# Patient Record
Sex: MALE | Race: WHITE | NOT HISPANIC OR LATINO | Employment: FULL TIME | URBAN - METROPOLITAN AREA
[De-identification: names, ages, dates, MRNs, and addresses within clinical notes are randomized per-mention and may not be internally consistent; named-entity substitution may affect disease eponyms.]

---

## 2017-03-02 ENCOUNTER — OFFICE VISIT (OUTPATIENT)
Dept: RETAIL CLINIC | Facility: CLINIC | Age: 42
End: 2017-03-02

## 2017-03-02 VITALS
DIASTOLIC BLOOD PRESSURE: 60 MMHG | HEART RATE: 90 BPM | RESPIRATION RATE: 16 BRPM | TEMPERATURE: 99.2 F | SYSTOLIC BLOOD PRESSURE: 132 MMHG | OXYGEN SATURATION: 98 %

## 2017-03-02 DIAGNOSIS — J11.1 INFLUENZA: Primary | ICD-10-CM

## 2017-03-02 LAB
EXPIRATION DATE: NORMAL
FLUAV AG NPH QL: NORMAL
FLUBV AG NPH QL: NORMAL
INTERNAL CONTROL: NORMAL
Lab: NORMAL

## 2017-03-02 PROCEDURE — 99213 OFFICE O/P EST LOW 20 MIN: CPT | Performed by: NURSE PRACTITIONER

## 2017-03-02 PROCEDURE — 87804 INFLUENZA ASSAY W/OPTIC: CPT | Performed by: NURSE PRACTITIONER

## 2017-03-02 RX ORDER — BROMPHENIRAMINE MALEATE, PSEUDOEPHEDRINE HYDROCHLORIDE, AND DEXTROMETHORPHAN HYDROBROMIDE 2; 30; 10 MG/5ML; MG/5ML; MG/5ML
SYRUP ORAL
Qty: 240 ML | Refills: 0 | Status: SHIPPED | OUTPATIENT
Start: 2017-03-02 | End: 2017-07-06

## 2017-03-02 RX ORDER — OSELTAMIVIR PHOSPHATE 75 MG/1
75 CAPSULE ORAL 2 TIMES DAILY
Qty: 10 CAPSULE | Refills: 0 | Status: SHIPPED | OUTPATIENT
Start: 2017-03-02 | End: 2017-03-07

## 2017-03-03 NOTE — PATIENT INSTRUCTIONS
"Influenza, Adult  Influenza (\"the flu\") is a viral infection of the respiratory tract. It occurs more often in winter months because people spend more time in close contact with one another. Influenza can make you feel very sick. Influenza easily spreads from person to person (contagious).  CAUSES   Influenza is caused by a virus that infects the respiratory tract. You can catch the virus by breathing in droplets from an infected person's cough or sneeze. You can also catch the virus by touching something that was recently contaminated with the virus and then touching your mouth, nose, or eyes.  RISKS AND COMPLICATIONS  You may be at risk for a more severe case of influenza if you smoke cigarettes, have diabetes, have chronic heart disease (such as heart failure) or lung disease (such as asthma), or if you have a weakened immune system. Elderly people and pregnant women are also at risk for more serious infections. The most common problem of influenza is a lung infection (pneumonia). Sometimes, this problem can require emergency medical care and may be life threatening.  SIGNS AND SYMPTOMS   Symptoms typically last 4 to 10 days and may include:  · Fever.  · Chills.  · Headache, body aches, and muscle aches.  · Sore throat.  · Chest discomfort and cough.  · Poor appetite.  · Weakness or feeling tired.  · Dizziness.  · Nausea or vomiting.  DIAGNOSIS   Diagnosis of influenza is often made based on your history and a physical exam. A nose or throat swab test can be done to confirm the diagnosis.  TREATMENT   In mild cases, influenza goes away on its own. Treatment is directed at relieving symptoms. For more severe cases, your health care provider may prescribe antiviral medicines to shorten the sickness. Antibiotic medicines are not effective because the infection is caused by a virus, not by bacteria.  HOME CARE INSTRUCTIONS  · Take medicines only as directed by your health care provider.  · Use a cool mist humidifier " to make breathing easier.  · Get plenty of rest until your temperature returns to normal. This usually takes 3 to 4 days.  · Drink enough fluid to keep your urine clear or pale yellow.  · Cover your mouth and nose when coughing or sneezing, and wash your hands well to prevent the virus from spreading.  · Stay home from work or school until the fever is gone for at least 1 full day.  PREVENTION   An annual influenza vaccination (flu shot) is the best way to avoid getting influenza. An annual flu shot is now routinely recommended for all adults in the [place]  SEEK MEDICAL CARE IF:  · You experience chest pain, your cough worsens, or you produce more mucus.  · You have nausea, vomiting, or diarrhea.  · Your fever returns or gets worse.  SEEK IMMEDIATE MEDICAL CARE IF:  · You have trouble breathing, you become short of breath, or your skin or nails become bluish.  · You have severe pain or stiffness in the neck.  · You develop a sudden headache, or pain in the face or ear.  · You have nausea or vomiting that you cannot control.  MAKE SURE YOU:   · Understand these instructions.  · Will watch your condition.  · Will get help right away if you are not doing well or get worse.     This information is not intended to replace advice given to you by your health care provider. Make sure you discuss any questions you have with your health care provider.     Document Released: 12/15/2001 Document Revised: 01/08/2016 Document Reviewed: 10/11/2016  [employer] Interactive Patient Education ©2016 [employer]

## 2017-03-03 NOTE — PROGRESS NOTES
Subjective:     Otto Pantoja is a 41 y.o.     Flu Symptoms   This is a new problem. The current episode started in the past 7 days. Associated symptoms include congestion, coughing, fatigue, a fever, myalgias, nausea (mild this am) and a sore throat (from drainage). Pertinent negatives include no rash or vomiting. Headaches: mildf. Treatments tried: antihistmaine and mucinex. The treatment provided no relief.         The following portions of the patient's history were reviewed and updated as appropriate: allergies, current medications, past family history, past medical history, past social history, past surgical history and problem list.      Review of Systems   Constitutional: Positive for fatigue and fever.   HENT: Positive for congestion, postnasal drip, sinus pressure and sore throat (from drainage).    Respiratory: Positive for cough and shortness of breath. Negative for wheezing.    Cardiovascular: Negative.    Gastrointestinal: Positive for nausea (mild this am). Negative for diarrhea and vomiting.   Musculoskeletal: Positive for myalgias.   Skin: Negative for color change, pallor and rash.   Neurological: Negative for dizziness and light-headedness. Headaches: mildf.         Objective:      Physical Exam   Constitutional: He is oriented to person, place, and time. He appears well-developed and well-nourished. He is cooperative.   couhging intermittently at visit   HENT:   Head: Normocephalic and atraumatic.   Right Ear: Tympanic membrane and ear canal normal.   Left Ear: Tympanic membrane and ear canal normal.   Nose: Rhinorrhea present.   Mouth/Throat: No oropharyngeal exudate or posterior oropharyngeal erythema.   Nares mildly congested. Post nasal drainage clear   Eyes: Conjunctivae, EOM and lids are normal. Pupils are equal, round, and reactive to light. Right eye conjunctiva injected: mildly. Left eye conjunctiva injected: mildly.   Neck: Normal range of motion. Neck supple.   Cardiovascular: Normal  rate, regular rhythm, S1 normal, S2 normal and normal heart sounds.    Pulmonary/Chest: Effort normal and breath sounds normal.   Abdominal: Soft. Normal appearance and bowel sounds are normal. There is no tenderness.   Musculoskeletal: Normal range of motion.   Neurological: He is alert and oriented to person, place, and time.   Skin: Skin is warm, dry and intact.   Psychiatric: He has a normal mood and affect. His speech is normal and behavior is normal.   Vitals reviewed.          Otto was seen today for flu symptoms.    Diagnoses and all orders for this visit:    Influenza  -     POC Influenza A / B    Other orders  -     brompheniramine-pseudoephedrine-DM (BROMFED DM) 30-2-10 MG/5ML syrup; 5 to 10 cc every 4 hours as needed for cough, congestion, allergies  -     oseltamivir (TAMIFLU) 75 MG capsule; Take 1 capsule by mouth 2 (Two) Times a Day for 5 days.

## 2017-07-06 ENCOUNTER — OFFICE VISIT (OUTPATIENT)
Dept: FAMILY MEDICINE CLINIC | Facility: CLINIC | Age: 42
End: 2017-07-06

## 2017-07-06 VITALS
TEMPERATURE: 98.3 F | WEIGHT: 167 LBS | OXYGEN SATURATION: 99 % | HEIGHT: 70 IN | SYSTOLIC BLOOD PRESSURE: 122 MMHG | HEART RATE: 86 BPM | DIASTOLIC BLOOD PRESSURE: 84 MMHG | BODY MASS INDEX: 23.91 KG/M2

## 2017-07-06 DIAGNOSIS — Z86.010 HISTORY OF COLON POLYPS: Primary | ICD-10-CM

## 2017-07-06 DIAGNOSIS — F17.210 CIGARETTE SMOKER: ICD-10-CM

## 2017-07-06 PROCEDURE — 99214 OFFICE O/P EST MOD 30 MIN: CPT | Performed by: NURSE PRACTITIONER

## 2017-07-06 NOTE — PROGRESS NOTES
"Subjective   Otto Pantoja is a 42 y.o. male.     History of Present Illness     New pt.  Former PCP Dr. Medina who is retiring soon.    Pt generally healthy.  Last fasting labs and CPE about 7 years ago.     Mother hx colon CA age 42--alive and well now.  Pt has had two c-scopes--2 and 4 yrs ago.  Initial scope revealed multiple benign polyps.  Second scope no polyps.  Due next year for repeat--Dr. Key.      Smokes 1/2 ppd since age 19.  Wants to quit.  Has tried Chantix--doesn't want to try again due to nightmares.      Went to [facility] a few days ago.  10/15 people developed N/V/D.  [place].  He is much better today.  But, still feels \"wiped out.\"  Diarrhea almost completely resolved now.  He states that he only mentioned this because his wife thought he should.  But, he is almost over the infection.  He is reporting the incident to the [employer].  I wonder if someone emptied their houseboat waste system into the lake.      S/p pilonidal cyst excision per Dr. Allen 2001.      He has had two actinic keratoses removed from his face--Dr. Guzmán.    Works as a .   w/two children.      The following portions of the patient's history were reviewed and updated as appropriate: allergies, current medications, past family history, past medical history, past social history, past surgical history and problem list.    Review of Systems   Gastrointestinal:        As discussed in the HPI   All other systems reviewed and are negative.      Objective   Physical Exam   Constitutional: Vital signs are normal. He appears well-developed and well-nourished.   Cardiovascular: Normal rate, regular rhythm, S1 normal, S2 normal and normal heart sounds.    No murmur heard.  Pulmonary/Chest: Effort normal and breath sounds normal.   Neurological: He is alert.   Psychiatric: He has a normal mood and affect.   Nursing note and vitals reviewed.      Assessment/Plan   Otto was seen today for " establish care.    Diagnoses and all orders for this visit:    History of colon polyps    Cigarette smoker        We spent <3 min discussing smoking cessation.  I recommended Raphael-Sloan method and nicotine patches.      RTC in the next month for CPE w/fasting labs.  Needs tetanus vaccination.  Will request records from former PCP.

## 2017-08-09 ENCOUNTER — TELEPHONE (OUTPATIENT)
Dept: FAMILY MEDICINE CLINIC | Facility: CLINIC | Age: 42
End: 2017-08-09

## 2017-08-09 ENCOUNTER — OFFICE VISIT (OUTPATIENT)
Dept: FAMILY MEDICINE CLINIC | Facility: CLINIC | Age: 42
End: 2017-08-09

## 2017-08-09 VITALS
DIASTOLIC BLOOD PRESSURE: 84 MMHG | HEIGHT: 70 IN | HEART RATE: 73 BPM | OXYGEN SATURATION: 100 % | SYSTOLIC BLOOD PRESSURE: 124 MMHG | TEMPERATURE: 98.3 F | BODY MASS INDEX: 24.48 KG/M2 | WEIGHT: 171 LBS

## 2017-08-09 DIAGNOSIS — F17.210 CIGARETTE SMOKER: ICD-10-CM

## 2017-08-09 DIAGNOSIS — Z00.00 ANNUAL PHYSICAL EXAM: Primary | ICD-10-CM

## 2017-08-09 LAB
DEVELOPER EXPIRATION DATE: NORMAL
DEVELOPER LOT NUMBER: NORMAL
EXPIRATION DATE: NORMAL
FECAL OCCULT BLOOD SCREEN, POC: NEGATIVE
Lab: NORMAL
NEGATIVE CONTROL: NEGATIVE
POSITIVE CONTROL: POSITIVE

## 2017-08-09 PROCEDURE — 90715 TDAP VACCINE 7 YRS/> IM: CPT | Performed by: NURSE PRACTITIONER

## 2017-08-09 PROCEDURE — 99396 PREV VISIT EST AGE 40-64: CPT | Performed by: NURSE PRACTITIONER

## 2017-08-09 PROCEDURE — 93000 ELECTROCARDIOGRAM COMPLETE: CPT | Performed by: NURSE PRACTITIONER

## 2017-08-09 PROCEDURE — 82274 ASSAY TEST FOR BLOOD FECAL: CPT | Performed by: NURSE PRACTITIONER

## 2017-08-09 PROCEDURE — 90471 IMMUNIZATION ADMIN: CPT | Performed by: NURSE PRACTITIONER

## 2017-08-09 NOTE — PROGRESS NOTES
Procedure     ECG 12 Lead  Date/Time: 8/9/2017 9:47 AM  Performed by: MONA SCHMIDT  Authorized by: MONA SCHMIDT   Interpreted by ED physician: Interpreted by myself.  Comparison: not compared with previous ECG   Previous ECG: no previous ECG available  Rate: normal  Conduction: conduction normal  ST Segments: ST segments normal  T Waves: T waves normal  QRS axis: normal  Other: no other findings  Clinical impression: normal ECG

## 2017-08-09 NOTE — PROGRESS NOTES
Subjective   Otto Pantoja is a 42 y.o. male.     History of Present Illness       Here for CPE with fasting labs.  No c/o's.    Needs tetanus vaccine.  Due for screening c-scope next year per Dr. Key.   colon CA.    He has decreased his smoking to a few cigarettes a day.    Exercises regularly.  .      Eye and dental exams UTD.       The following portions of the patient's history were reviewed and updated as appropriate: allergies, current medications, past family history, past medical history, past social history, past surgical history and problem list.    Review of Systems   Respiratory: Negative.    Cardiovascular: Negative.    Gastrointestinal: Negative.    All other systems reviewed and are negative.      Objective   Physical Exam   Constitutional: He is oriented to person, place, and time. Vital signs are normal. He appears well-developed and well-nourished. He is cooperative.   HENT:   Head: Normocephalic and atraumatic.   Right Ear: Hearing, tympanic membrane, external ear and ear canal normal.   Left Ear: Hearing, tympanic membrane, external ear and ear canal normal.   Nose: Nose normal.   Mouth/Throat: Uvula is midline, oropharynx is clear and moist and mucous membranes are normal. No tonsillar exudate.   Eyes: Conjunctivae, EOM and lids are normal. Pupils are equal, round, and reactive to light.   Neck: Trachea normal, normal range of motion, full passive range of motion without pain and phonation normal. Neck supple. Carotid bruit is not present. No thyromegaly present.   Cardiovascular: Normal rate, regular rhythm, S1 normal, S2 normal and normal heart sounds.    No murmur heard.  Pulses:       Carotid pulses are 2+ on the right side, and 2+ on the left side.       Radial pulses are 2+ on the right side, and 2+ on the left side.        Femoral pulses are 2+ on the right side, and 2+ on the left side.       Dorsalis pedis pulses are 2+ on the right side, and 2+ on the left side.         Posterior tibial pulses are 2+ on the right side, and 2+ on the left side.   No carotid bruits   Pulmonary/Chest: Effort normal and breath sounds normal. He has no decreased breath sounds. He has no wheezes. He has no rhonchi. He has no rales.   Abdominal: Soft. Normal appearance, normal aorta and bowel sounds are normal. He exhibits no distension. There is no hepatosplenomegaly, splenomegaly or hepatomegaly. There is no tenderness. No hernia. Hernia confirmed negative in the ventral area, confirmed negative in the right inguinal area and confirmed negative in the left inguinal area.   Genitourinary: Rectum normal, testes normal and penis normal. Rectal exam shows guaiac negative stool. Circumcised.   Musculoskeletal: Normal range of motion.        Right shoulder: Normal.        Left shoulder: Normal.        Right elbow: Normal.       Left elbow: Normal.        Right wrist: Normal.        Left wrist: Normal.        Right hip: Normal.        Left hip: Normal.        Right knee: Normal.        Left knee: Normal.        Right ankle: Normal.        Left ankle: Normal.        Cervical back: Normal.        Thoracic back: Normal.        Lumbar back: Normal.        Right upper arm: Normal.        Left upper arm: Normal.        Right forearm: Normal.        Left forearm: Normal.        Right hand: Normal.        Left hand: Normal.        Right upper leg: Normal.        Left upper leg: Normal.        Right lower leg: Normal.        Left lower leg: Normal.        Right foot: Normal.        Left foot: Normal.   Lymphadenopathy:        Head (right side): No submental, no submandibular and no tonsillar adenopathy present.        Head (left side): No submental, no submandibular and no tonsillar adenopathy present.     He has no cervical adenopathy.     He has no axillary adenopathy. No inguinal adenopathy noted on the right or left side.        Right: No inguinal and no supraclavicular adenopathy present.        Left: No inguinal  and no supraclavicular adenopathy present.   Neurological: He is alert and oriented to person, place, and time. He has normal strength and normal reflexes. No cranial nerve deficit or sensory deficit. He displays a negative Romberg sign. GCS eye subscore is 4. GCS verbal subscore is 5. GCS motor subscore is 6.   Skin: Skin is warm, dry and intact. No rash noted. No cyanosis. Nails show no clubbing.   Psychiatric: He has a normal mood and affect. His speech is normal and behavior is normal. Judgment and thought content normal. Cognition and memory are normal.   Nursing note and vitals reviewed.      Assessment/Plan   Otto was seen today for annual exam.    Diagnoses and all orders for this visit:    Annual physical exam  -     CBC & Differential  -     Lipid Panel With / Chol / HDL Ratio  -     Comprehensive Metabolic Panel  -     Urinalysis With / Culture If Indicated  -     TSH Rfx On Abnormal To Free T4  -     Vitamin D 25 Hydroxy  -     POC Occult Blood Stool    Cigarette smoker    Other orders  -     Tdap Vaccine Greater Than or Equal To 6yo IM        <3 min spent discussing smoking cessation.      Await lab results.    rec flu vaccine this year.

## 2017-08-10 LAB
25(OH)D3+25(OH)D2 SERPL-MCNC: 36.9 NG/ML (ref 30–100)
ALBUMIN SERPL-MCNC: 4.5 G/DL (ref 3.5–5.2)
ALBUMIN/GLOB SERPL: 1.7 G/DL
ALP SERPL-CCNC: 72 U/L (ref 39–117)
ALT SERPL-CCNC: 25 U/L (ref 1–41)
APPEARANCE UR: CLEAR
AST SERPL-CCNC: 18 U/L (ref 1–40)
BACTERIA #/AREA URNS HPF: NORMAL /HPF
BASOPHILS # BLD AUTO: 0.02 10*3/MM3 (ref 0–0.2)
BASOPHILS NFR BLD AUTO: 0.3 % (ref 0–1.5)
BILIRUB SERPL-MCNC: 1 MG/DL (ref 0.1–1.2)
BILIRUB UR QL STRIP: NEGATIVE
BUN SERPL-MCNC: 11 MG/DL (ref 6–20)
BUN/CREAT SERPL: 12.1 (ref 7–25)
CALCIUM SERPL-MCNC: 10.3 MG/DL (ref 8.6–10.5)
CHLORIDE SERPL-SCNC: 102 MMOL/L (ref 98–107)
CHOLEST SERPL-MCNC: 214 MG/DL (ref 0–200)
CHOLEST/HDLC SERPL: 2.97 {RATIO}
CO2 SERPL-SCNC: 28.5 MMOL/L (ref 22–29)
COLOR UR: YELLOW
CREAT SERPL-MCNC: 0.91 MG/DL (ref 0.76–1.27)
EOSINOPHIL # BLD AUTO: 0.18 10*3/MM3 (ref 0–0.7)
EOSINOPHIL NFR BLD AUTO: 2.5 % (ref 0.3–6.2)
EPI CELLS #/AREA URNS HPF: NORMAL /HPF
ERYTHROCYTE [DISTWIDTH] IN BLOOD BY AUTOMATED COUNT: 13.4 % (ref 11.5–14.5)
GLOBULIN SER CALC-MCNC: 2.6 GM/DL
GLUCOSE SERPL-MCNC: 99 MG/DL (ref 65–99)
GLUCOSE UR QL: NEGATIVE
HCT VFR BLD AUTO: 50.7 % (ref 40.4–52.2)
HDLC SERPL-MCNC: 72 MG/DL (ref 40–60)
HGB BLD-MCNC: 17 G/DL (ref 13.7–17.6)
HGB UR QL STRIP: NEGATIVE
IMM GRANULOCYTES # BLD: 0 10*3/MM3 (ref 0–0.03)
IMM GRANULOCYTES NFR BLD: 0 % (ref 0–0.5)
KETONES UR QL STRIP: NEGATIVE
LDLC SERPL CALC-MCNC: 122 MG/DL (ref 0–100)
LEUKOCYTE ESTERASE UR QL STRIP: NEGATIVE
LYMPHOCYTES # BLD AUTO: 2.78 10*3/MM3 (ref 0.9–4.8)
LYMPHOCYTES NFR BLD AUTO: 38 % (ref 19.6–45.3)
MCH RBC QN AUTO: 31.9 PG (ref 27–32.7)
MCHC RBC AUTO-ENTMCNC: 33.5 G/DL (ref 32.6–36.4)
MCV RBC AUTO: 95.1 FL (ref 79.8–96.2)
MICRO URNS: NORMAL
MICRO URNS: NORMAL
MONOCYTES # BLD AUTO: 0.46 10*3/MM3 (ref 0.2–1.2)
MONOCYTES NFR BLD AUTO: 6.3 % (ref 5–12)
MUCOUS THREADS URNS QL MICRO: PRESENT /HPF
NEUTROPHILS # BLD AUTO: 3.88 10*3/MM3 (ref 1.9–8.1)
NEUTROPHILS NFR BLD AUTO: 52.9 % (ref 42.7–76)
NITRITE UR QL STRIP: NEGATIVE
PH UR STRIP: 7 [PH] (ref 5–7.5)
PLATELET # BLD AUTO: 254 10*3/MM3 (ref 140–500)
POTASSIUM SERPL-SCNC: 5.4 MMOL/L (ref 3.5–5.2)
PROT SERPL-MCNC: 7.1 G/DL (ref 6–8.5)
PROT UR QL STRIP: NEGATIVE
RBC # BLD AUTO: 5.33 10*6/MM3 (ref 4.6–6)
RBC #/AREA URNS HPF: NORMAL /HPF
SODIUM SERPL-SCNC: 142 MMOL/L (ref 136–145)
SP GR UR: 1.02 (ref 1–1.03)
TRIGL SERPL-MCNC: 101 MG/DL (ref 0–150)
TSH SERPL DL<=0.005 MIU/L-ACNC: 2.08 MIU/ML (ref 0.27–4.2)
URINALYSIS REFLEX: NORMAL
UROBILINOGEN UR STRIP-MCNC: 0.2 MG/DL (ref 0.2–1)
VLDLC SERPL CALC-MCNC: 20.2 MG/DL (ref 5–40)
WBC # BLD AUTO: 7.32 10*3/MM3 (ref 4.5–10.7)
WBC #/AREA URNS HPF: NORMAL /HPF

## 2017-11-16 ENCOUNTER — OFFICE VISIT (OUTPATIENT)
Dept: RETAIL CLINIC | Facility: CLINIC | Age: 42
End: 2017-11-16

## 2017-11-16 VITALS
OXYGEN SATURATION: 98 % | TEMPERATURE: 98.7 F | SYSTOLIC BLOOD PRESSURE: 141 MMHG | RESPIRATION RATE: 18 BRPM | DIASTOLIC BLOOD PRESSURE: 82 MMHG | HEART RATE: 99 BPM

## 2017-11-16 DIAGNOSIS — J01.00 ACUTE NON-RECURRENT MAXILLARY SINUSITIS: Primary | ICD-10-CM

## 2017-11-16 PROCEDURE — 99213 OFFICE O/P EST LOW 20 MIN: CPT | Performed by: NURSE PRACTITIONER

## 2017-11-16 RX ORDER — PREDNISONE 20 MG/1
20 TABLET ORAL 2 TIMES DAILY
Qty: 14 TABLET | Refills: 0 | Status: SHIPPED | OUTPATIENT
Start: 2017-11-16 | End: 2017-11-23

## 2017-11-16 RX ORDER — AZELASTINE 1 MG/ML
2 SPRAY, METERED NASAL 2 TIMES DAILY
Qty: 30 ML | Refills: 0 | Status: SHIPPED | OUTPATIENT
Start: 2017-11-16 | End: 2017-11-30

## 2017-11-17 NOTE — PROGRESS NOTES
"Lakshmi Pantoja is a 42 y.o. male.     Sinusitis   This is a new problem. Episode onset: 3 days ago. The problem is unchanged. There has been no fever. The pain is mild. Associated symptoms include congestion, coughing (nonproductive), headaches, sinus pressure and a sore throat (\"scratchy throat\"). Pertinent negatives include no chills, diaphoresis, ear pain, hoarse voice, neck pain, shortness of breath, sneezing or swollen glands. Past treatments include nothing.       The following portions of the patient's history were reviewed and updated as appropriate: allergies, current medications, past family history, past medical history, past social history, past surgical history and problem list.    Review of Systems   Constitutional: Positive for fatigue. Negative for appetite change, chills, diaphoresis and fever.   HENT: Positive for congestion, postnasal drip, sinus pressure and sore throat (\"scratchy throat\"). Negative for ear discharge, ear pain, facial swelling, hearing loss, hoarse voice, mouth sores, nosebleeds, rhinorrhea, sinus pain, sneezing, trouble swallowing and voice change.    Eyes: Negative for pain, discharge, redness, itching and visual disturbance.   Respiratory: Positive for cough (nonproductive). Negative for chest tightness, shortness of breath, wheezing and stridor.    Cardiovascular: Negative for chest pain and palpitations.   Gastrointestinal: Negative for abdominal pain, constipation, diarrhea, nausea and vomiting.   Genitourinary: Negative for decreased urine volume.   Musculoskeletal: Negative for myalgias, neck pain and neck stiffness.   Skin: Negative for rash.   Allergic/Immunologic: Negative for environmental allergies and immunocompromised state.   Neurological: Positive for headaches. Negative for dizziness, syncope and weakness.       Objective   Physical Exam   Constitutional: He is oriented to person, place, and time. He appears well-developed and well-nourished. He is " cooperative.  Non-toxic appearance. He does not appear ill. No distress.   HENT:   Right Ear: Hearing, external ear and ear canal normal. Tympanic membrane is not perforated, not erythematous, not retracted and not bulging. A middle ear effusion is present.   Left Ear: Hearing, external ear and ear canal normal. Tympanic membrane is not perforated, not erythematous, not retracted and not bulging. A middle ear effusion is present.   Nose: Mucosal edema present. No rhinorrhea or sinus tenderness. Right sinus exhibits maxillary sinus tenderness. Right sinus exhibits no frontal sinus tenderness. Left sinus exhibits maxillary sinus tenderness. Left sinus exhibits no frontal sinus tenderness.   Mouth/Throat: Uvula is midline and mucous membranes are normal. Posterior oropharyngeal erythema (mild, cobblestoning) present. No oropharyngeal exudate or posterior oropharyngeal edema. Tonsils are 2+ on the right. Tonsils are 2+ on the left. No tonsillar exudate.   Eyes: Conjunctivae and lids are normal.   Cardiovascular: Normal rate, regular rhythm, S1 normal and S2 normal.    Pulmonary/Chest: Effort normal and breath sounds normal.   Abdominal: Bowel sounds are normal. There is no tenderness.   Lymphadenopathy:     He has no cervical adenopathy.   Neurological: He is alert and oriented to person, place, and time.   Skin: Skin is warm and dry. He is not diaphoretic.   Vitals reviewed.      Assessment/Plan   Otto was seen today for sinusitis.    Diagnoses and all orders for this visit:    Acute non-recurrent maxillary sinusitis  -     predniSONE (DELTASONE) 20 MG tablet; Take 1 tablet by mouth 2 (Two) Times a Day for 7 days.  -     azelastine (ASTELIN) 0.1 % nasal spray; 2 sprays into each nostril 2 (Two) Times a Day for 14 days. Use in each nostril as directed          -     Increase H2O intake         -     Follow up with PCP or urgent treatment for persistent or worsening symptoms

## 2018-10-18 ENCOUNTER — OFFICE VISIT (OUTPATIENT)
Dept: RETAIL CLINIC | Facility: CLINIC | Age: 43
End: 2018-10-18

## 2018-10-18 VITALS
OXYGEN SATURATION: 98 % | DIASTOLIC BLOOD PRESSURE: 78 MMHG | TEMPERATURE: 98.4 F | RESPIRATION RATE: 18 BRPM | SYSTOLIC BLOOD PRESSURE: 106 MMHG | HEART RATE: 96 BPM

## 2018-10-18 DIAGNOSIS — J32.9 SINUSITIS, UNSPECIFIED CHRONICITY, UNSPECIFIED LOCATION: Primary | ICD-10-CM

## 2018-10-18 PROCEDURE — 99213 OFFICE O/P EST LOW 20 MIN: CPT | Performed by: NURSE PRACTITIONER

## 2018-10-18 RX ORDER — PREDNISONE 20 MG/1
20 TABLET ORAL 2 TIMES DAILY
Qty: 10 TABLET | Refills: 0 | Status: SHIPPED | OUTPATIENT
Start: 2018-10-18 | End: 2019-01-19

## 2018-10-18 RX ORDER — DOXYCYCLINE 100 MG/1
100 CAPSULE ORAL 2 TIMES DAILY
Qty: 10 CAPSULE | Refills: 0 | Status: SHIPPED | OUTPATIENT
Start: 2018-10-18 | End: 2019-01-19

## 2018-10-18 NOTE — PATIENT INSTRUCTIONS

## 2018-10-18 NOTE — PROGRESS NOTES
Subjective:     Otto Pantoja is a 43 y.o.     Sinusitis   The current episode started in the past 7 days. There has been no fever. Associated symptoms include congestion (green/yellow), coughing, headaches, sinus pressure and a sore throat. Pertinent negatives include no shortness of breath. Ear pain: feel clogged. Treatments tried: mucinex D, antihistamine. The treatment provided no relief.         The following portions of the patient's history were reviewed and updated as appropriate: allergies, current medications, past family history, past medical history, past social history, past surgical history and problem list.      Review of Systems   HENT: Positive for congestion (green/yellow), sinus pain, sinus pressure and sore throat. Ear pain: feel clogged.    Respiratory: Positive for cough. Negative for shortness of breath and wheezing.    Cardiovascular: Negative.    Neurological: Positive for headaches.         Objective:      Physical Exam   HENT:   Head: Normocephalic and atraumatic.   Right Ear: Ear canal normal. Right ear middle ear effusion: mild serous.   Left Ear: Ear canal normal. Left ear middle ear effusion: mild serous.   Mouth/Throat: Posterior oropharyngeal erythema present. No oropharyngeal exudate.   Mild purulent nasal congestion   Lymphadenopathy:     He has no cervical adenopathy.   Vitals reviewed.          Otto was seen today for sinusitis.    Diagnoses and all orders for this visit:    Sinusitis, unspecified chronicity, unspecified location    Other orders  -     predniSONE (DELTASONE) 20 MG tablet; Take 1 tablet by mouth 2 (Two) Times a Day.  -     doxycycline (MONODOX) 100 MG capsule; Take 1 capsule by mouth 2 (Two) Times a Day.

## 2019-01-19 ENCOUNTER — APPOINTMENT (OUTPATIENT)
Dept: GENERAL RADIOLOGY | Facility: HOSPITAL | Age: 44
End: 2019-01-19

## 2019-01-19 ENCOUNTER — HOSPITAL ENCOUNTER (EMERGENCY)
Facility: HOSPITAL | Age: 44
Discharge: HOME | End: 2019-01-19
Attending: EMERGENCY MEDICINE | Admitting: EMERGENCY MEDICINE

## 2019-01-19 VITALS
SYSTOLIC BLOOD PRESSURE: 123 MMHG | HEIGHT: 70 IN | RESPIRATION RATE: 16 BRPM | OXYGEN SATURATION: 99 % | BODY MASS INDEX: 24.34 KG/M2 | HEART RATE: 105 BPM | TEMPERATURE: 98 F | WEIGHT: 170 LBS | DIASTOLIC BLOOD PRESSURE: 82 MMHG

## 2019-01-19 DIAGNOSIS — S42.025A NONDISPLACED FRACTURE OF SHAFT OF LEFT CLAVICLE, INITIAL ENCOUNTER FOR CLOSED FRACTURE: Primary | ICD-10-CM

## 2019-01-19 PROCEDURE — 73030 X-RAY EXAM OF SHOULDER: CPT

## 2019-01-19 PROCEDURE — 99283 EMERGENCY DEPT VISIT LOW MDM: CPT

## 2019-01-19 RX ORDER — HYDROCODONE BITARTRATE AND ACETAMINOPHEN 5; 325 MG/1; MG/1
1 TABLET ORAL EVERY 6 HOURS PRN
Qty: 14 TABLET | Refills: 0 | OUTPATIENT
Start: 2019-01-19 | End: 2019-06-17

## 2019-01-19 NOTE — DISCHARGE INSTRUCTIONS
Ice your shoulder and wear a sling for four weeks.  Do follow up with Dr. Gong.  Please return to the ED if you develop a fever or trouble breathing.

## 2019-01-19 NOTE — ED PROVIDER NOTES
EMERGENCY DEPARTMENT ENCOUNTER    CHIEF COMPLAINT  Chief Complaint: Shoulder Pain  History given by: Pt  History limited by: none  Room Number: 04/04  PMD: Mayito Medina MD      HPI:  Pt is a 43 y.o. male who presents complaining of left shoulder pain s/p fall last night. Pt states he has taken Ibuprofen that has improved the pain. 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 Pt states he truck his left shoulder on the ground and only mildly hit his head. Pt denies headache or LOC. While trying to put deodorant this morning, he noticed an intense pain while lifting up his left arm. Pt denies L elbow, L wrist pain, numbness, tingling, or weakness in left arm. Pt denies Hx of medical problems. Pt smokes about 0.5 PPD. Pt is a social drinker.     Duration:  One day  Onset: sudden  Timing: constant  Location: left shoulder  Radiation: none  Quality: pain  Intensity/Severity: moderate  Progression: unchanged  Associated Symptoms: none  Aggravating Factors: movement  Alleviating Factors: rest  Previous Episodes: none  Treatment before arrival: Pt has taken Ibuprofen that has improved the pain.    PAST MEDICAL HISTORY  Active Ambulatory Problems     Diagnosis Date Noted   • Cigarette smoker 07/06/2017   • History of colon polyps 07/06/2017     Resolved Ambulatory Problems     Diagnosis Date Noted   • No Resolved Ambulatory Problems     No Additional Past Medical History       PAST SURGICAL HISTORY  Past Surgical History:   Procedure Laterality Date   • COLONOSCOPY     • CYST REMOVAL         FAMILY HISTORY  Family History   Problem Relation Age of Onset   • Colon cancer Mother    • Depression Mother    • Heart disease Father    • Diabetes Father    • Depression Father    • Glaucoma Father        SOCIAL HISTORY  Social History     Socioeconomic History   • Marital status:      Spouse name: Not on file   • Number of children: Not on file   • Years of education:  Not on file   • Highest education level: Not on file   Social Needs   • Financial resource strain: Not on file   • Food insecurity - worry: Not on file   • Food insecurity - inability: Not on file   • Transportation needs - medical: Not on file   • Transportation needs - non-medical: Not on file   Occupational History   • Not on file   Tobacco Use   • Smoking status: Current Every Day Smoker     Packs/day: 0.50     Types: Cigarettes   • Smokeless tobacco: Never Used   • Tobacco comment: Since age 19   Substance and Sexual Activity   • Alcohol use: Yes     Comment: social   • Drug use: No   • Sexual activity: Yes     Partners: Female   Other Topics Concern   • Not on file   Social History Narrative   • Not on file       ALLERGIES  Patient has no known allergies.    REVIEW OF SYSTEMS  Review of Systems   Constitutional: Negative for activity change, appetite change and fever.   HENT: Negative for congestion and sore throat.    Eyes: Negative.    Respiratory: Negative for cough and shortness of breath.    Cardiovascular: Negative for chest pain and leg swelling.   Gastrointestinal: Negative for abdominal pain, diarrhea and vomiting.   Endocrine: Negative.    Genitourinary: Negative for decreased urine volume and dysuria.   Musculoskeletal: Positive for arthralgias (left shoulder pain s/p fall last night, pain with movement). Negative for neck pain.   Skin: Negative for rash and wound.   Allergic/Immunologic: Negative.    Neurological: Negative for weakness, numbness and headaches.   Hematological: Negative.    Psychiatric/Behavioral: Negative.    All other systems reviewed and are negative.      PHYSICAL EXAM  ED Triage Vitals [01/19/19 1656]   Temp Heart Rate Resp BP SpO2   98 °F (36.7 °C) 105 16 -- 99 %      Temp src Heart Rate Source Patient Position BP Location FiO2 (%)   Tympanic Monitor -- -- --       Physical Exam   Constitutional: He appears distressed (mild).   HENT:   Head: Normocephalic and atraumatic.    Eyes: EOM are normal.   Neck: Normal range of motion.   Cardiovascular: Normal rate and regular rhythm.   No murmur heard.  Pulmonary/Chest: Effort normal and breath sounds normal. No respiratory distress. He has no wheezes. He has no rales.   Abdominal: There is no tenderness.   Musculoskeletal: He exhibits no edema.        Cervical back: He exhibits no tenderness.        Thoracic back: He exhibits no tenderness.   L shoulder is swollen. Tenderness to left AC joint. Left shoulder pain with abduction.   Neurological: He is alert.   Skin: Skin is warm and dry.   Nursing note and vitals reviewed.      RADIOLOGY  XR Shoulder 2+ View Left   Preliminary Result   Suspect nondisplaced hairline fracture of the mid left   clavicle.               I ordered the above noted radiological studies. Interpreted by radiologist. Discussed with radiologist (). Reviewed by me in PACS.       PROCEDURES  Procedures       Splint Application:  Splint Type: Sling  Indication: Pain, Fx  Splint placed by ERT  Post splint application:   1) neurovascularly intact   2) good position  Discussed splint care with patient  Discussed PMD/orthopedic follow up      PROGRESS AND CONSULTS        1732  Ordered XR L shoulder for further evaluation.    1808  Pt recheck. Pt is resting in bed comfortably. Notified pt of XR L shoulder that shows nondiscplaced mid-clavicle hairline Fx. Discussed plan to discharge pt home in a sling with Rx for pain. I informed pt Fx will take 4-6 weeks to heal. I instructed pt to rest, apply ice, and to f/u with Ortho. Pt understands and agrees with plan, all concerns addressed.     Rodney# = [id number]. rodney is negative.     RODNEY query complete. Treatment plan to include limited course of prescribed controlled substance. Risks including addiction, benefits, and alternatives presented to patient.         MEDICAL DECISION MAKING  Results were reviewed/discussed with the patient and they were also made aware of online access.  Pt also made aware that some labs, such as cultures, will not be resulted during ER visit and follow up with PMD is necessary.     MDM  Number of Diagnoses or Management Options  Nondisplaced fracture of shaft of left clavicle, initial encounter for closed fracture:      Amount and/or Complexity of Data Reviewed  Tests in the radiology section of CPT®: ordered and reviewed (XR L shoulder that shows nondiscplaced mid-clavicle hairline Fx)           DIAGNOSIS  Final diagnoses:   Nondisplaced fracture of shaft of left clavicle, initial encounter for closed fracture       DISPOSITION  DISCHARGE    Patient discharged in stable condition.    Reviewed implications of results, diagnosis, meds, responsibility to follow up, warning signs and symptoms of possible worsening, potential complications and reasons to return to ER.    Patient/Family voiced understanding of above instructions.    Discussed plan for discharge, as there is no emergent indication for admission. Patient referred to primary care provider for BP management due to today's BP. Pt/family is agreeable and understands need for follow up and repeat testing.  Pt is aware that discharge does not mean that nothing is wrong but it indicates no emergency is present that requires admission and they must continue care with follow-up as given below or physician of their choice.     FOLLOW-UP  Daquan Gong MD  [address] [id number] [phone]    Schedule an appointment as soon as possible for a visit            Medication List      New Prescriptions    HYDROcodone-acetaminophen 5-325 MG per tablet  Commonly known as:  NORCO  Take 1 tablet by mouth Every 6 (Six) Hours As Needed for Moderate Pain .              Latest Documented Vital Signs:  As of 7:31 PM  BP- 123/82 HR- 105 Temp- 98 °F (36.7 °C) (Tympanic) O2 sat- 99%    --  Documentation assistance provided by nereyda Stein for Dr. Reyna.  Information recorded by the nereyda was  done at my direction and has been verified and validated by me.          Juvencio Stein  01/19/19 1931       Garrett Reyna MD  01/19/19 3065

## 2019-06-17 ENCOUNTER — HOSPITAL ENCOUNTER (EMERGENCY)
Facility: HOSPITAL | Age: 44
Discharge: HOME | End: 2019-06-17
Attending: EMERGENCY MEDICINE | Admitting: EMERGENCY MEDICINE

## 2019-06-17 VITALS
DIASTOLIC BLOOD PRESSURE: 97 MMHG | HEIGHT: 69 IN | HEART RATE: 88 BPM | WEIGHT: 175 LBS | BODY MASS INDEX: 25.92 KG/M2 | TEMPERATURE: 98.5 F | SYSTOLIC BLOOD PRESSURE: 134 MMHG | RESPIRATION RATE: 16 BRPM | OXYGEN SATURATION: 98 %

## 2019-06-17 DIAGNOSIS — L02.212 ABSCESS OF LOWER BACK: Primary | ICD-10-CM

## 2019-06-17 PROCEDURE — 99283 EMERGENCY DEPT VISIT LOW MDM: CPT

## 2019-06-17 RX ORDER — CLINDAMYCIN HYDROCHLORIDE 150 MG/1
450 CAPSULE ORAL 4 TIMES DAILY
Qty: 84 CAPSULE | Refills: 0 | Status: SHIPPED | OUTPATIENT
Start: 2019-06-17 | End: 2019-06-24

## 2019-10-23 ENCOUNTER — OFFICE VISIT (OUTPATIENT)
Dept: GASTROENTEROLOGY | Facility: CLINIC | Age: 44
End: 2019-10-23

## 2019-10-23 VITALS
TEMPERATURE: 98.8 F | WEIGHT: 176.8 LBS | HEIGHT: 69 IN | DIASTOLIC BLOOD PRESSURE: 76 MMHG | BODY MASS INDEX: 26.19 KG/M2 | SYSTOLIC BLOOD PRESSURE: 116 MMHG

## 2019-10-23 DIAGNOSIS — K63.5 POLYP OF COLON, UNSPECIFIED PART OF COLON, UNSPECIFIED TYPE: ICD-10-CM

## 2019-10-23 DIAGNOSIS — R19.7 DIARRHEA, UNSPECIFIED TYPE: Primary | ICD-10-CM

## 2019-10-23 DIAGNOSIS — K21.9 GASTROESOPHAGEAL REFLUX DISEASE, ESOPHAGITIS PRESENCE NOT SPECIFIED: ICD-10-CM

## 2019-10-23 DIAGNOSIS — Z80.0 FH: COLON CANCER: ICD-10-CM

## 2019-10-23 PROCEDURE — 99204 OFFICE O/P NEW MOD 45 MIN: CPT | Performed by: INTERNAL MEDICINE

## 2019-10-23 NOTE — PROGRESS NOTES
Chief Complaint   Patient presents with   • Diarrhea       History of Present Illness:   44 y.o. male who has a personal history of colon polyps and his mom had colon cancer at 42 years of age.  He last had a colonoscopy 2/8/2016.  I went up to the terminal ileum.  The colon was normal except for internal hemorrhoids. His brother has UC. C/o diarrhea x many years. He has an average of 2-5 BM/day. He may have rectal bleeding. Rare dark stool. No constipation. May have bilat lateral abdominal pain. He doesn't know what makes the abdominal pain worse but is better with imodium. NO nausea or vomiting. NO fevers, chills. Weight stable. No foreign travel. Last antibiotics one year ago. He drinks 6 drinks at night on Friday and Sat. 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 +heartburn but no dysphagia.     History reviewed. No pertinent past medical history.    Past Surgical History:   Procedure Laterality Date   • COLONOSCOPY  02/08/2016    [facility], no specimens collected   • COLONOSCOPY  08/28/2014    [facility], tubular adenoma   • CYST REMOVAL         No current outpatient medications on file.    No Known Allergies    Family History   Problem Relation Age of Onset   • Colon cancer Mother    • Depression Mother    • Heart disease Father    • Diabetes Father    • Depression Father    • Glaucoma Father    • Ulcerative colitis Brother    • Colon cancer Maternal Grandfather        Social History     Socioeconomic History   • Marital status:      Spouse name: Not on file   • Number of children: Not on file   • Years of education: Not on file   • Highest education level: Not on file   Tobacco Use   • Smoking status: Current Every Day Smoker     Packs/day: 0.50     Types: Cigarettes   • Smokeless tobacco: Never Used   • Tobacco comment: Since age 19   Substance and Sexual Activity   • Alcohol use: Yes     Comment: social   • Drug use: No   • Sexual activity: Yes     Partners: Female       Review of Systems   All other systems reviewed and are  negative.      Vitals:    10/23/19 1305   BP: 116/76   Temp: 98.8 °F (37.1 °C)       Physical Exam   Constitutional: He is oriented to person, place, and time. He appears well-developed and well-nourished. No distress.   HENT:   Head: Normocephalic and atraumatic. Hair is normal.   Right Ear: Hearing, tympanic membrane, external ear and ear canal normal.   Left Ear: Hearing, tympanic membrane, external ear and ear canal normal.   Nose: No sinus tenderness or nasal deformity.   Mouth/Throat: Uvula is midline, oropharynx is clear and moist and mucous membranes are normal. No oral lesions. No uvula swelling.   Eyes: Conjunctivae, EOM and lids are normal. Pupils are equal, round, and reactive to light. Right eye exhibits no discharge. Left eye exhibits no discharge. No scleral icterus. Right eye exhibits normal extraocular motion and no nystagmus. Left eye exhibits normal extraocular motion and no nystagmus.   Neck: Normal range of motion. Neck supple. No JVD present. No thyromegaly present.   Cardiovascular: Normal rate, regular rhythm, normal heart sounds, intact distal pulses and normal pulses. Exam reveals no gallop.   No murmur heard.  Pulmonary/Chest: Effort normal and breath sounds normal. No respiratory distress. He has no wheezes. He has no rales. He exhibits no tenderness.   Abdominal: Soft. Bowel sounds are normal. He exhibits no distension and no mass. There is no tenderness. There is no guarding. No hernia.   Genitourinary: Rectal exam shows guaiac negative stool.   Musculoskeletal: Normal range of motion. He exhibits no edema, tenderness or deformity.   Lymphadenopathy:     He has no cervical adenopathy.   Neurological: He is alert and oriented to person, place, and time. He has normal reflexes. He displays normal reflexes. No cranial nerve deficit. He exhibits normal muscle tone. Coordination normal.   Skin: Skin is warm and dry. No rash noted. He is not diaphoretic.   Psychiatric: He has a normal mood  and affect. His behavior is normal. Judgment and thought content normal.   Vitals reviewed.      Otto was seen today for diarrhea.    Diagnoses and all orders for this visit:    Diarrhea, unspecified type  -     CBC & Differential  -     Celiac Ab tTG DGP TIgA  -     Comprehensive Metabolic Panel  -     TSH  -     Clostridium Difficile EIA - Stool, Per Rectum  -     Fecal Fat, Qualitative - Stool, Per Rectum  -     Fecal Leukocytes - Stool, Per Rectum  -     Giardia Antigen - Stool, Per Rectum  -     Ova & Parasite Examination - Stool, Per Rectum  -     Stool Culture (Reference Lab) - Stool, Per Rectum  -     Case Request; Standing  -     Case Request    Polyp of colon, unspecified part of colon, unspecified type  -     Case Request; Standing  -     Case Request    FH: colon cancer  -     Case Request; Standing  -     Case Request    Gastroesophageal reflux disease, esophagitis presence not specified  -     Case Request; Standing  -     Case Request    Other orders  -     Follow Anesthesia Guidelines / Standing Orders; Future  -     Obtain Informed Consent; Future  -     Implement Anesthesia orders day of procedure.; Standing  -     Obtain informed consent; Standing  -     Verify bowel prep was successful; Standing  -     Give tap water enema if bowel prep was insufficient; Standing      Assessment:  1.  Personal history of colon polyps.  2.  Family history of colon cancer in that his mom had colon cancer at 42 years of age.  3. Diarrhea  4) Brother has UC.   5) GERD    Recommendations:  1. Labs:  2) stool studies  3) Colonoscopy and EGD    No Follow-up on file.    Adrian Key MD  10/23/2019

## 2019-10-24 LAB
ALBUMIN SERPL-MCNC: 5.3 G/DL (ref 3.5–5.2)
ALBUMIN/GLOB SERPL: 2.5 G/DL
ALP SERPL-CCNC: 73 U/L (ref 39–117)
ALT SERPL-CCNC: 18 U/L (ref 1–41)
AST SERPL-CCNC: 15 U/L (ref 1–40)
BASOPHILS # BLD AUTO: 0.07 10*3/MM3 (ref 0–0.2)
BASOPHILS NFR BLD AUTO: 0.7 % (ref 0–1.5)
BILIRUB SERPL-MCNC: 0.8 MG/DL (ref 0.2–1.2)
BUN SERPL-MCNC: 12 MG/DL (ref 6–20)
BUN/CREAT SERPL: 12.9 (ref 7–25)
CALCIUM SERPL-MCNC: 10.1 MG/DL (ref 8.6–10.5)
CHLORIDE SERPL-SCNC: 101 MMOL/L (ref 98–107)
CO2 SERPL-SCNC: 26.8 MMOL/L (ref 22–29)
CREAT SERPL-MCNC: 0.93 MG/DL (ref 0.76–1.27)
EOSINOPHIL # BLD AUTO: 0.08 10*3/MM3 (ref 0–0.4)
EOSINOPHIL NFR BLD AUTO: 0.8 % (ref 0.3–6.2)
ERYTHROCYTE [DISTWIDTH] IN BLOOD BY AUTOMATED COUNT: 12.6 % (ref 12.3–15.4)
GLIADIN PEPTIDE IGA SER-ACNC: 4 UNITS (ref 0–19)
GLIADIN PEPTIDE IGG SER-ACNC: 4 UNITS (ref 0–19)
GLOBULIN SER CALC-MCNC: 2.1 GM/DL
GLUCOSE SERPL-MCNC: 96 MG/DL (ref 65–99)
HCT VFR BLD AUTO: 49.7 % (ref 37.5–51)
HGB BLD-MCNC: 16.9 G/DL (ref 13–17.7)
IGA SERPL-MCNC: 104 MG/DL (ref 90–386)
IMM GRANULOCYTES # BLD AUTO: 0.04 10*3/MM3 (ref 0–0.05)
IMM GRANULOCYTES NFR BLD AUTO: 0.4 % (ref 0–0.5)
LYMPHOCYTES # BLD AUTO: 2.4 10*3/MM3 (ref 0.7–3.1)
LYMPHOCYTES NFR BLD AUTO: 24.7 % (ref 19.6–45.3)
MCH RBC QN AUTO: 30.6 PG (ref 26.6–33)
MCHC RBC AUTO-ENTMCNC: 34 G/DL (ref 31.5–35.7)
MCV RBC AUTO: 90 FL (ref 79–97)
MONOCYTES # BLD AUTO: 0.39 10*3/MM3 (ref 0.1–0.9)
MONOCYTES NFR BLD AUTO: 4 % (ref 5–12)
NEUTROPHILS # BLD AUTO: 6.75 10*3/MM3 (ref 1.7–7)
NEUTROPHILS NFR BLD AUTO: 69.4 % (ref 42.7–76)
NRBC BLD AUTO-RTO: 0 /100 WBC (ref 0–0.2)
PLATELET # BLD AUTO: 294 10*3/MM3 (ref 140–450)
POTASSIUM SERPL-SCNC: 4.8 MMOL/L (ref 3.5–5.2)
PROT SERPL-MCNC: 7.4 G/DL (ref 6–8.5)
RBC # BLD AUTO: 5.52 10*6/MM3 (ref 4.14–5.8)
SODIUM SERPL-SCNC: 141 MMOL/L (ref 136–145)
TSH SERPL DL<=0.005 MIU/L-ACNC: 1.54 UIU/ML (ref 0.27–4.2)
TTG IGA SER-ACNC: <2 U/ML (ref 0–3)
TTG IGG SER-ACNC: <2 U/ML (ref 0–5)
WBC # BLD AUTO: 9.73 10*3/MM3 (ref 3.4–10.8)

## 2019-10-25 ENCOUNTER — TELEPHONE (OUTPATIENT)
Dept: GASTROENTEROLOGY | Facility: CLINIC | Age: 44
End: 2019-10-25

## 2019-10-25 NOTE — TELEPHONE ENCOUNTER
----- Message from Adrian Key MD sent at 10/25/2019  7:40 AM EDT -----  10/25/19  Tell him that his lab work looks good.  We will see what the stool studies, EGD, and colonoscopy show?  Please fax a copy of this report to his PCP.  Thx. kjh

## 2019-10-25 NOTE — PROGRESS NOTES
10/25/19  Tell him that his lab work looks good.  We will see what the stool studies, EGD, and colonoscopy show?  Please fax a copy of this report to his PCP.  Luis bustillos

## 2019-10-27 LAB
C DIFF TOX A+B STL QL IA: NEGATIVE
G LAMBLIA AG STL QL IA: NEGATIVE

## 2019-10-28 NOTE — TELEPHONE ENCOUNTER
Call to pt.  Advise per Dr Key that lab work looks good.  Will see what the stool studies, egd and c/s show.  Verb understanding.

## 2019-10-28 NOTE — PROGRESS NOTES
10/28/19  Tell him that the stool studies came back normal looking.  We will see what the EGD and colonoscopy show?  Thx. kjh

## 2019-10-29 ENCOUNTER — TELEPHONE (OUTPATIENT)
Dept: GASTROENTEROLOGY | Facility: CLINIC | Age: 44
End: 2019-10-29

## 2019-10-29 LAB
BACTERIA SPEC CULT: NORMAL
BACTERIA SPEC CULT: NORMAL
CAMPYLOBACTER STL CULT: NORMAL
E COLI SXT STL QL IA: NEGATIVE
FAT STL QL: NORMAL
NEUTRAL FAT STL QL: NORMAL
SALM + SHIG STL CULT: NORMAL

## 2019-10-29 NOTE — TELEPHONE ENCOUNTER
Called pt and advised per Dr Key that his stool studies came back normal.  He will see what his egd and c/s shows.  Pt verb understanding.

## 2019-10-29 NOTE — TELEPHONE ENCOUNTER
----- Message from Adrian Key MD sent at 10/28/2019  4:47 PM EDT -----  10/28/19  Tell him that the stool studies came back normal looking.  We will see what the EGD and colonoscopy show?  Thx. kjh

## 2019-10-30 LAB
O+P SPEC MICRO: NORMAL
O+P STL CONC: NORMAL
WBC STL QL MICRO: NORMAL
WBC STL QL MICRO: NORMAL

## 2019-11-04 ENCOUNTER — APPOINTMENT (OUTPATIENT)
Dept: CT IMAGING | Facility: HOSPITAL | Age: 44
End: 2019-11-04

## 2019-11-04 ENCOUNTER — HOSPITAL ENCOUNTER (EMERGENCY)
Facility: HOSPITAL | Age: 44
Discharge: HOME | End: 2019-11-04
Attending: EMERGENCY MEDICINE | Admitting: EMERGENCY MEDICINE

## 2019-11-04 VITALS
HEIGHT: 70 IN | TEMPERATURE: 98.2 F | WEIGHT: 175 LBS | HEART RATE: 90 BPM | DIASTOLIC BLOOD PRESSURE: 90 MMHG | OXYGEN SATURATION: 99 % | BODY MASS INDEX: 25.05 KG/M2 | SYSTOLIC BLOOD PRESSURE: 145 MMHG | RESPIRATION RATE: 16 BRPM

## 2019-11-04 DIAGNOSIS — S29.019A ACUTE THORACIC MYOFASCIAL STRAIN, INITIAL ENCOUNTER: Primary | ICD-10-CM

## 2019-11-04 PROCEDURE — 99283 EMERGENCY DEPT VISIT LOW MDM: CPT

## 2019-11-04 PROCEDURE — 71250 CT THORAX DX C-: CPT

## 2019-11-04 PROCEDURE — 96372 THER/PROPH/DIAG INJ SC/IM: CPT

## 2019-11-04 PROCEDURE — 25010000002 KETOROLAC TROMETHAMINE PER 15 MG: Performed by: EMERGENCY MEDICINE

## 2019-11-04 RX ORDER — KETOROLAC TROMETHAMINE 30 MG/ML
30 INJECTION, SOLUTION INTRAMUSCULAR; INTRAVENOUS ONCE
Status: COMPLETED | OUTPATIENT
Start: 2019-11-04 | End: 2019-11-04

## 2019-11-04 RX ORDER — CYCLOBENZAPRINE HCL 10 MG
10 TABLET ORAL 3 TIMES DAILY PRN
Qty: 30 TABLET | Refills: 0 | Status: SHIPPED | OUTPATIENT
Start: 2019-11-04 | End: 2020-10-26

## 2019-11-04 RX ADMIN — KETOROLAC TROMETHAMINE 30 MG: 30 INJECTION, SOLUTION INTRAMUSCULAR at 21:28

## 2019-11-05 NOTE — ED PROVIDER NOTES
EMERGENCY DEPARTMENT ENCOUNTER    Room Number:  05/05  Date seen:  11/4/2019  Time seen: 10:44 PM  PCP: Mayito Medina MD  Historian: Patient      HPI:  Chief Complaint: Back pain  A complete HPI/ROS/PMH/PSH/SH/FH are unobtainable due to: Nothing  Context: Otto Pantoja is a 44 y.o. male who presents to the ED c/o left lower back pain onset today after he bent over to  something.  The pain is says worse with deep breathing and also with sneezing.  Is also worse with certain movements.  He had fallen 2 days ago and landed on his back.  He denies any acute pain at that time.  He denies bowel or bladder incontinence.  He said no tingling or numbness in his extremities.  He works as a  for [employer].            PAST MEDICAL HISTORY  Active Ambulatory Problems     Diagnosis Date Noted   • Cigarette smoker 07/06/2017   • History of colon polyps 07/06/2017   • Diarrhea 10/23/2019   • Polyp of colon 10/23/2019   • FH: colon cancer 10/23/2019   • Gastroesophageal reflux disease 10/23/2019     Resolved Ambulatory Problems     Diagnosis Date Noted   • No Resolved Ambulatory Problems     No Additional Past Medical History         PAST SURGICAL HISTORY  Past Surgical History:   Procedure Laterality Date   • COLONOSCOPY  02/08/2016    [facility], no specimens collected   • COLONOSCOPY  08/28/2014    [facility], tubular adenoma   • CYST REMOVAL           FAMILY HISTORY  Family History   Problem Relation Age of Onset   • Colon cancer Mother    • Depression Mother    • Heart disease Father    • Diabetes Father    • Depression Father    • Glaucoma Father    • Ulcerative colitis Brother    • Colon cancer Maternal Grandfather          SOCIAL HISTORY  Social History     Socioeconomic History   • Marital status:      Spouse name: Not on file   • Number of children: Not on file   • Years of education: Not on file   • Highest education level: Not on file   Tobacco Use   • Smoking status: Current Every Day Smoker     Packs/day: 0.50      Types: Cigarettes   • Smokeless tobacco: Never Used   • Tobacco comment: Since age 19   Substance and Sexual Activity   • Alcohol use: Yes     Comment: social   • Drug use: No   • Sexual activity: Yes     Partners: Female         ALLERGIES  Patient has no known allergies.        REVIEW OF SYSTEMS  Review of Systems   Musculoskeletal: Positive for back pain.   All other systems reviewed and are negative.           PHYSICAL EXAM  ED Triage Vitals   Temp Heart Rate Resp BP SpO2   11/04/19 2047 11/04/19 2047 11/04/19 2047 11/04/19 2056 11/04/19 2047   98.2 °F (36.8 °C) 99 17 (!) 146/105 98 %      Temp src Heart Rate Source Patient Position BP Location FiO2 (%)   11/04/19 2047 -- -- -- --   Tympanic             GENERAL: Awake and alert, no acute distress  HENT: nares patent  EYES: no scleral icterus  CV: regular rhythm, normal rate  RESPIRATORY: normal effort  ABDOMEN: soft  MUSCULOSKELETAL: no deformity.  No midline T or L-spine tenderness.  There is point tenderness just left of the thoracic spine at approximately T10 vertebral level.  NEURO: alert, moves all extremities, follows commands. 5/5 strength with hip flexion, knee flex/ext, plantarflexion, and dorsiflexion of the feet BLE. Normal sensation to light touch and pin prick throughout BLE. 2+ DTR at the knee BLE.  2+ DP/PT pulses BLE.    SKIN: warm, dry    Vital signs and nursing notes reviewed.                RADIOLOGY  Ct Chest Without Contrast    Result Date: 11/4/2019  CT OF THE CHEST WITHOUT CONTRAST  HISTORY: Pleuritic back pain following a fall yesterday  COMPARISON: None available.  TECHNIQUE: Axial CT imaging was obtained from the thoracic inlet through the dome of the diaphragm. No IV contrast was administered.  FINDINGS: Lungs appear clear. No pneumothorax, pleural effusion, or acute infiltrates seen. The thyroid gland, trachea, and esophagus appear unremarkable. There is no pericardial effusion. There are coronary artery calcifications. Mediastinal  lymph nodes do not appear pathologically enlarged. No rib fractures are identified. No thoracic vertebral fractures are seen. The serratus anterior on the left may be mildly thickened when compared to the contralateral side, which could reflect some hemorrhage. However, no large chest wall hematoma is seen. Images through the upper abdomen do not demonstrate any acute abnormalities.       1. No rib fractures or thoracic vertebral fracture seen. 2. Questionable asymmetric thickening of the left serratus anterior when compared to the contralateral side may reflect some intramuscular hemorrhage. However, no large chest wall hematoma is identified.  Radiation dose reduction techniques were utilized, including automated exposure control and exposure modulation based on body size.  This report was finalized on 11/4/2019 10:22 PM by Dr. Shaina Arriaza M.D.        Ordered the above noted radiological studies. Reviewed by me in PACS.            PROCEDURES  Procedures              MEDICATIONS GIVEN IN ER  Medications   ketorolac (TORADOL) injection 30 mg (30 mg Intramuscular Given 11/4/19 2128)                   PROGRESS AND CONSULTS           MEDICAL DECISION MAKING    44-year-old male presents with acute thoracic back pain.  He had a recent fall but the pain was acutely worse today after he bent over.  It is worse with inspiration however he is not hypoxic and he has reproducible tenderness at the site on exam, suggesting muscular skeletal etiology.  CT of the chest reveals no rib fracture, no pneumothorax, and no thoracic spine fracture.  There is a questionable old left thoracic transverse process fracture.  Symptoms most likely related to acute muscle strain.  He is given Toradol here.  Home with NSAIDs and Flexeril as needed.  Return precautions were discussed.    MDM           DIAGNOSIS  Final diagnoses:   Acute thoracic myofascial strain, initial encounter         DISPOSITION  Discharge            Latest Documented  Vital Signs:  As of 10:44 PM  BP- (!) 146/105 HR- 99 Temp- 98.2 °F (36.8 °C) (Tympanic) O2 sat- 98%        --    Please note that portions of this were completed with a voice recognition program.          Mayito Harrison MD  11/04/19 7656

## 2019-11-05 NOTE — ED TRIAGE NOTES
Pt to ED with c/o mid back pain that worsened today.  Pt reports falling on Friday, landing on his back.  Pt reports when he bent over this evening he felt severe pain.  Pt denies hitting his head, being on blood thinners or loosing control of bladder or bowels.

## 2020-01-10 ENCOUNTER — HOSPITAL ENCOUNTER (OUTPATIENT)
Facility: HOSPITAL | Age: 45
Setting detail: HOSPITAL OUTPATIENT SURGERY
Discharge: HOME | End: 2020-01-10
Attending: INTERNAL MEDICINE | Admitting: INTERNAL MEDICINE

## 2020-01-10 ENCOUNTER — ANESTHESIA (OUTPATIENT)
Dept: GASTROENTEROLOGY | Facility: HOSPITAL | Age: 45
End: 2020-01-10

## 2020-01-10 ENCOUNTER — ANESTHESIA EVENT (OUTPATIENT)
Dept: GASTROENTEROLOGY | Facility: HOSPITAL | Age: 45
End: 2020-01-10

## 2020-01-10 VITALS
HEART RATE: 71 BPM | RESPIRATION RATE: 16 BRPM | BODY MASS INDEX: 22.76 KG/M2 | OXYGEN SATURATION: 99 % | HEIGHT: 70 IN | DIASTOLIC BLOOD PRESSURE: 66 MMHG | SYSTOLIC BLOOD PRESSURE: 99 MMHG | WEIGHT: 159 LBS | TEMPERATURE: 98.4 F

## 2020-01-10 DIAGNOSIS — R19.7 DIARRHEA, UNSPECIFIED TYPE: ICD-10-CM

## 2020-01-10 DIAGNOSIS — K21.9 GASTROESOPHAGEAL REFLUX DISEASE, ESOPHAGITIS PRESENCE NOT SPECIFIED: ICD-10-CM

## 2020-01-10 DIAGNOSIS — Z80.0 FH: COLON CANCER: ICD-10-CM

## 2020-01-10 DIAGNOSIS — K63.5 POLYP OF COLON, UNSPECIFIED PART OF COLON, UNSPECIFIED TYPE: ICD-10-CM

## 2020-01-10 PROCEDURE — 87081 CULTURE SCREEN ONLY: CPT | Performed by: INTERNAL MEDICINE

## 2020-01-10 PROCEDURE — 43239 EGD BIOPSY SINGLE/MULTIPLE: CPT | Performed by: INTERNAL MEDICINE

## 2020-01-10 PROCEDURE — 25010000002 PROPOFOL 10 MG/ML EMULSION: Performed by: NURSE ANESTHETIST, CERTIFIED REGISTERED

## 2020-01-10 PROCEDURE — 88313 SPECIAL STAINS GROUP 2: CPT | Performed by: INTERNAL MEDICINE

## 2020-01-10 PROCEDURE — 88305 TISSUE EXAM BY PATHOLOGIST: CPT | Performed by: INTERNAL MEDICINE

## 2020-01-10 PROCEDURE — 45380 COLONOSCOPY AND BIOPSY: CPT | Performed by: INTERNAL MEDICINE

## 2020-01-10 RX ORDER — PROPOFOL 10 MG/ML
VIAL (ML) INTRAVENOUS CONTINUOUS PRN
Status: DISCONTINUED | OUTPATIENT
Start: 2020-01-10 | End: 2020-01-10 | Stop reason: SURG

## 2020-01-10 RX ORDER — SODIUM CHLORIDE, SODIUM LACTATE, POTASSIUM CHLORIDE, CALCIUM CHLORIDE 600; 310; 30; 20 MG/100ML; MG/100ML; MG/100ML; MG/100ML
1000 INJECTION, SOLUTION INTRAVENOUS CONTINUOUS
Status: DISCONTINUED | OUTPATIENT
Start: 2020-01-10 | End: 2020-01-10 | Stop reason: HOSPADM

## 2020-01-10 RX ORDER — PROPOFOL 10 MG/ML
VIAL (ML) INTRAVENOUS AS NEEDED
Status: DISCONTINUED | OUTPATIENT
Start: 2020-01-10 | End: 2020-01-10 | Stop reason: SURG

## 2020-01-10 RX ORDER — LIDOCAINE HYDROCHLORIDE 20 MG/ML
INJECTION, SOLUTION INFILTRATION; PERINEURAL AS NEEDED
Status: DISCONTINUED | OUTPATIENT
Start: 2020-01-10 | End: 2020-01-10 | Stop reason: SURG

## 2020-01-10 RX ADMIN — SODIUM CHLORIDE, POTASSIUM CHLORIDE, SODIUM LACTATE AND CALCIUM CHLORIDE 1000 ML: 600; 310; 30; 20 INJECTION, SOLUTION INTRAVENOUS at 08:46

## 2020-01-10 RX ADMIN — PROPOFOL 160 MCG/KG/MIN: 10 INJECTION, EMULSION INTRAVENOUS at 09:33

## 2020-01-10 RX ADMIN — PROPOFOL 100 MG: 10 INJECTION, EMULSION INTRAVENOUS at 09:33

## 2020-01-10 RX ADMIN — LIDOCAINE HYDROCHLORIDE 60 MG: 20 INJECTION, SOLUTION INFILTRATION; PERINEURAL at 09:33

## 2020-01-10 NOTE — ANESTHESIA PREPROCEDURE EVALUATION
Anesthesia Evaluation     Patient summary reviewed   NPO Solid Status: > 8 hours  NPO Liquid Status: > 6 hours           Airway   Mallampati: II  TM distance: >3 FB  Neck ROM: full  No difficulty expected  Dental - normal exam     Pulmonary     breath sounds clear to auscultation  Cardiovascular   Exercise tolerance: good (4-7 METS)    Rhythm: regular  Rate: normal        Neuro/Psych  GI/Hepatic/Renal/Endo      Musculoskeletal     Abdominal    Substance History      OB/GYN          Other                        Anesthesia Plan    ASA 1     MAC     intravenous induction     Anesthetic plan, all risks, benefits, and alternatives have been provided, discussed and informed consent has been obtained with: patient and spouse/significant other.

## 2020-01-10 NOTE — ANESTHESIA POSTPROCEDURE EVALUATION
Patient: Otto Pantoja    Procedure Summary     Date:  01/10/20 Room / Location:  [facility] ENDOSCOPY 6 /  [facility]    Anesthesia Start:  0929 Anesthesia Stop:  1032    Procedures:       ESOPHAGOGASTRODUODENOSCOPY WITH BIOPSIES (N/A Esophagus)      COLONOSCOPY INTO CECUM AND TI WITH COLD BX POLYPECTOMIES, BIOPSIES (N/A ) Diagnosis:       Diarrhea, unspecified type      Polyp of colon, unspecified part of colon, unspecified type      FH: colon cancer      Gastroesophageal reflux disease, esophagitis presence not specified      (Diarrhea, unspecified type [R19.7])      (Polyp of colon, unspecified part of colon, unspecified type [K63.5])      (FH: colon cancer [Z80.0])      (Gastroesophageal reflux disease, esophagitis presence not specified [K21.9])    Surgeon:  Adrian Key MD Provider:  Casimiro Hwang MD    Anesthesia Type:  MAC ASA Status:  1          Anesthesia Type: MAC    Vitals  Vitals Value Taken Time   BP 99/66 1/10/2020 10:49 AM   Temp     Pulse 71 1/10/2020 10:49 AM   Resp 16 1/10/2020 10:49 AM   SpO2 99 % 1/10/2020 10:49 AM           Post Anesthesia Care and Evaluation    Patient location during evaluation: bedside  Patient participation: complete - patient participated  Level of consciousness: awake and alert  Pain score: 0  Pain management: adequate  Airway patency: patent  Anesthetic complications: No anesthetic complications  PONV Status: none  Cardiovascular status: acceptable  Respiratory status: acceptable  Hydration status: acceptable  Post Neuraxial Block status: Motor and sensory function returned to baseline

## 2020-01-10 NOTE — H&P
Chief Complaint   Patient presents with   • Diarrhea         History of Present Illness:   44 y.o. male who has a personal history of colon polyps and his mom had colon cancer at 42 years of age.  He last had a colonoscopy 2/8/2016.  I went up to the terminal ileum.  The colon was normal except for internal hemorrhoids. His brother has UC. C/o diarrhea x many years. He has an average of 2-5 BM/day. He may have rectal bleeding. Rare dark stool. No constipation. May have bilat lateral abdominal pain. He doesn't know what makes the abdominal pain worse but is better with imodium. NO nausea or vomiting. NO fevers, chills. Weight stable. No foreign travel. Last antibiotics one year ago. He drinks 6 drinks at night on Friday and Sat. 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 +heartburn but no dysphagia.      Medical History   History reviewed. No pertinent past medical history.        Surgical History         Past Surgical History:   Procedure Laterality Date   • COLONOSCOPY   02/08/2016     IH, no specimens collected   • COLONOSCOPY   08/28/2014     IH, tubular adenoma   • CYST REMOVAL                No current outpatient medications on file.     No Known Allergies           Family History   Problem Relation Age of Onset   • Colon cancer Mother     • Depression Mother     • Heart disease Father     • Diabetes Father     • Depression Father     • Glaucoma Father     • Ulcerative colitis Brother     • Colon cancer Maternal Grandfather           Social History   Social History            Socioeconomic History   • Marital status:        Spouse name: Not on file   • Number of children: Not on file   • Years of education: Not on file   • Highest education level: Not on file   Tobacco Use   • Smoking status: Current Every Day Smoker       Packs/day: 0.50       Types: Cigarettes   • Smokeless tobacco: Never Used   • Tobacco comment: Since age 19   Substance and Sexual Activity   • Alcohol use: Yes       Comment: social   • Drug use: No    • Sexual activity: Yes       Partners: Female            Review of Systems   All other systems reviewed and are negative.            Vitals:     10/23/19 1305   BP: 116/76   Temp: 98.8 °F (37.1 °C)         Physical Exam   Constitutional: He is oriented to person, place, and time. He appears well-developed and well-nourished. No distress.   HENT:   Head: Normocephalic and atraumatic. Hair is normal.   Right Ear: Hearing, tympanic membrane, external ear and ear canal normal.   Left Ear: Hearing, tympanic membrane, external ear and ear canal normal.   Nose: No sinus tenderness or nasal deformity.   Mouth/Throat: Uvula is midline, oropharynx is clear and moist and mucous membranes are normal. No oral lesions. No uvula swelling.   Eyes: Conjunctivae, EOM and lids are normal. Pupils are equal, round, and reactive to light. Right eye exhibits no discharge. Left eye exhibits no discharge. No scleral icterus. Right eye exhibits normal extraocular motion and no nystagmus. Left eye exhibits normal extraocular motion and no nystagmus.   Neck: Normal range of motion. Neck supple. No JVD present. No thyromegaly present.   Cardiovascular: Normal rate, regular rhythm, normal heart sounds, intact distal pulses and normal pulses. Exam reveals no gallop.   No murmur heard.  Pulmonary/Chest: Effort normal and breath sounds normal. No respiratory distress. He has no wheezes. He has no rales. He exhibits no tenderness.   Abdominal: Soft. Bowel sounds are normal. He exhibits no distension and no mass. There is no tenderness. There is no guarding. No hernia.   Genitourinary: Rectal exam shows guaiac negative stool.   Musculoskeletal: Normal range of motion. He exhibits no edema, tenderness or deformity.   Lymphadenopathy:     He has no cervical adenopathy.   Neurological: He is alert and oriented to person, place, and time. He has normal reflexes. He displays normal reflexes. No cranial nerve deficit. He exhibits normal muscle tone.  Coordination normal.   Skin: Skin is warm and dry. No rash noted. He is not diaphoretic.   Psychiatric: He has a normal mood and affect. His behavior is normal. Judgment and thought content normal.   Vitals reviewed.        Otto was seen today for diarrhea.     Diagnoses and all orders for this visit:     Diarrhea, unspecified type  -     CBC & Differential  -     Celiac Ab tTG DGP TIgA  -     Comprehensive Metabolic Panel  -     TSH  -     Clostridium Difficile EIA - Stool, Per Rectum  -     Fecal Fat, Qualitative - Stool, Per Rectum  -     Fecal Leukocytes - Stool, Per Rectum  -     Giardia Antigen - Stool, Per Rectum  -     Ova & Parasite Examination - Stool, Per Rectum  -     Stool Culture (Reference Lab) - Stool, Per Rectum  -     Case Request; Standing  -     Case Request     Polyp of colon, unspecified part of colon, unspecified type  -     Case Request; Standing  -     Case Request     FH: colon cancer  -     Case Request; Standing  -     Case Request     Gastroesophageal reflux disease, esophagitis presence not specified  -     Case Request; Standing  -     Case Request     Other orders  -     Follow Anesthesia Guidelines / Standing Orders; Future  -     Obtain Informed Consent; Future  -     Implement Anesthesia orders day of procedure.; Standing  -     Obtain informed consent; Standing  -     Verify bowel prep was successful; Standing  -     Give tap water enema if bowel prep was insufficient; Standing        Assessment:  1.  Personal history of colon polyps.  2.  Family history of colon cancer in that his mom had colon cancer at 42 years of age.  3. Diarrhea  4) Brother has UC.   5) GERD     Recommendations:  1. Labs:  2) stool studies  3) Colonoscopy and EGD     No Follow-up on file.     1/10/2020 - No change from the above H and P.   Adrian Key MD

## 2020-01-11 LAB — UREASE TISS QL: NEGATIVE

## 2020-01-14 LAB
CYTO UR: NORMAL
LAB AP CASE REPORT: NORMAL
LAB AP DIAGNOSIS COMMENT: NORMAL
PATH REPORT.FINAL DX SPEC: NORMAL
PATH REPORT.GROSS SPEC: NORMAL

## 2020-01-15 ENCOUNTER — TELEPHONE (OUTPATIENT)
Dept: GASTROENTEROLOGY | Facility: CLINIC | Age: 45
End: 2020-01-15

## 2020-01-15 NOTE — TELEPHONE ENCOUNTER
----- Message from Adrian Key MD sent at 1/15/2020  7:14 AM EST -----  01/15/20  Tell him that path from the EGD showed minimal inflammation in the stomach (but no evidence of helicobacter pylori) and minimal inflammation of the distal esophagus (probably from GERD).        The colon polyps that were removed were not cancerous and not precancerous. There were biopsies that had some inflammation in them. Have him come see me in the office and we can discuss this and his diarrhea. In the meantime, if he is on NSAIDs have him stop them between now and when I see him in the office and we can see if his diarrhea resolves? FAX to his pcp.  Thx. kjh    **Call to pt.  Advise of above.  Verb understanding.  States will call back to make f/u appt - needs to check work schedule.  Path report faxed via epic to DR Mayito Medina.  Message to Dr Key.  Kiesha Madsen RN.

## 2020-01-15 NOTE — PROGRESS NOTES
01/15/20  Tell him that path from the EGD showed minimal inflammation in the stomach (but no evidence of helicobacter pylori) and minimal inflammation of the distal esophagus (probably from GERD).        The colon polyps that were removed were not cancerous and not precancerous. There were biopsies that had some inflammation in them. Have him come see me in the office and we can discuss this and his diarrhea. In the meantime, if he is on NSAIDs have him stop them between now and when I see him in the office and we can see if his diarrhea resolves? FAX to his pcp.  Ernie. [email or web address]

## 2020-10-26 ENCOUNTER — OFFICE VISIT (OUTPATIENT)
Dept: GASTROENTEROLOGY | Facility: CLINIC | Age: 45
End: 2020-10-26

## 2020-10-26 VITALS
BODY MASS INDEX: 24.5 KG/M2 | HEIGHT: 69 IN | SYSTOLIC BLOOD PRESSURE: 135 MMHG | DIASTOLIC BLOOD PRESSURE: 70 MMHG | WEIGHT: 165.4 LBS

## 2020-10-26 DIAGNOSIS — K52.9 COLITIS: ICD-10-CM

## 2020-10-26 DIAGNOSIS — Z80.0 FH: COLON CANCER: ICD-10-CM

## 2020-10-26 DIAGNOSIS — R10.13 DYSPEPSIA: ICD-10-CM

## 2020-10-26 DIAGNOSIS — K63.5 POLYP OF COLON, UNSPECIFIED PART OF COLON, UNSPECIFIED TYPE: ICD-10-CM

## 2020-10-26 DIAGNOSIS — R19.7 DIARRHEA, UNSPECIFIED TYPE: Primary | ICD-10-CM

## 2020-10-26 LAB
ALBUMIN SERPL-MCNC: 4.4 G/DL (ref 3.5–5.2)
ALBUMIN/GLOB SERPL: 2.8 G/DL
ALP SERPL-CCNC: 70 U/L (ref 39–117)
ALT SERPL-CCNC: 18 U/L (ref 1–41)
AMYLASE SERPL-CCNC: 78 U/L (ref 28–100)
AST SERPL-CCNC: 14 U/L (ref 1–40)
BASOPHILS # BLD AUTO: 0.05 10*3/MM3 (ref 0–0.2)
BASOPHILS NFR BLD AUTO: 0.5 % (ref 0–1.5)
BILIRUB SERPL-MCNC: 0.5 MG/DL (ref 0–1.2)
BUN SERPL-MCNC: 15 MG/DL (ref 6–20)
BUN/CREAT SERPL: 18.1 (ref 7–25)
CALCIUM SERPL-MCNC: 9.2 MG/DL (ref 8.6–10.5)
CHLORIDE SERPL-SCNC: 105 MMOL/L (ref 98–107)
CO2 SERPL-SCNC: 26.4 MMOL/L (ref 22–29)
CREAT SERPL-MCNC: 0.83 MG/DL (ref 0.76–1.27)
EOSINOPHIL # BLD AUTO: 0.23 10*3/MM3 (ref 0–0.4)
EOSINOPHIL NFR BLD AUTO: 2.3 % (ref 0.3–6.2)
ERYTHROCYTE [DISTWIDTH] IN BLOOD BY AUTOMATED COUNT: 12.4 % (ref 12.3–15.4)
GLOBULIN SER CALC-MCNC: 1.6 GM/DL
GLUCOSE SERPL-MCNC: 108 MG/DL (ref 65–99)
HCT VFR BLD AUTO: 47.3 % (ref 37.5–51)
HGB BLD-MCNC: 16.2 G/DL (ref 13–17.7)
IMM GRANULOCYTES # BLD AUTO: 0.04 10*3/MM3 (ref 0–0.05)
IMM GRANULOCYTES NFR BLD AUTO: 0.4 % (ref 0–0.5)
LIPASE SERPL-CCNC: 39 U/L (ref 13–60)
LYMPHOCYTES # BLD AUTO: 1.86 10*3/MM3 (ref 0.7–3.1)
LYMPHOCYTES NFR BLD AUTO: 18.7 % (ref 19.6–45.3)
MCH RBC QN AUTO: 31.2 PG (ref 26.6–33)
MCHC RBC AUTO-ENTMCNC: 34.2 G/DL (ref 31.5–35.7)
MCV RBC AUTO: 91 FL (ref 79–97)
MONOCYTES # BLD AUTO: 0.67 10*3/MM3 (ref 0.1–0.9)
MONOCYTES NFR BLD AUTO: 6.7 % (ref 5–12)
NEUTROPHILS # BLD AUTO: 7.11 10*3/MM3 (ref 1.7–7)
NEUTROPHILS NFR BLD AUTO: 71.4 % (ref 42.7–76)
NRBC BLD AUTO-RTO: 0 /100 WBC (ref 0–0.2)
PLATELET # BLD AUTO: 228 10*3/MM3 (ref 140–450)
POTASSIUM SERPL-SCNC: 4.3 MMOL/L (ref 3.5–5.2)
PROT SERPL-MCNC: 6 G/DL (ref 6–8.5)
RBC # BLD AUTO: 5.2 10*6/MM3 (ref 4.14–5.8)
SODIUM SERPL-SCNC: 140 MMOL/L (ref 136–145)
TSH SERPL DL<=0.005 MIU/L-ACNC: 1.67 UIU/ML (ref 0.27–4.2)
WBC # BLD AUTO: 9.96 10*3/MM3 (ref 3.4–10.8)

## 2020-10-26 PROCEDURE — 99214 OFFICE O/P EST MOD 30 MIN: CPT | Performed by: INTERNAL MEDICINE

## 2020-10-26 RX ORDER — AMOXICILLIN AND CLAVULANATE POTASSIUM 875; 125 MG/1; MG/1
TABLET, FILM COATED ORAL
COMMUNITY
Start: 2020-10-19 | End: 2021-06-26

## 2020-10-26 RX ORDER — MESALAMINE 0.38 G/1
CAPSULE, EXTENDED RELEASE ORAL
Qty: 120 CAPSULE | Refills: 11 | Status: SHIPPED | OUTPATIENT
Start: 2020-10-26 | End: 2021-10-27

## 2020-10-26 NOTE — PROGRESS NOTES
Chief Complaint   Patient presents with   • Scope Follow Up   • Diarrhea       History of Present Illness:   45 y.o. male who may get diarrhea after eating a prime rib. He may get diarrhea x years. He does take lots of imodium AD. He averages 2-3 BM/day. Yesterday had 3 Bm/day. No rectal bleeding. Occasional epigastrric discomfort and rumbling that is worse after eating. Drinks 2 cups of coffee/day. May have a large Coke/day. No nausea or vomiting. No fevers, chills. He has lost weight (beccause he works too much according to his wife).    with a personal history of colonic polyps. His mom had colon cancer at 42 yrs of  age. His brother has ulcerative colitis. The patient has diarrhea.  He last had an EGD and colonoscopy in 1 of 2020.  Pathology showed:  Tissue Pathology Exam: [id number]  Order: [id number]  Status:  Final result   Visible to patient:  Yes (MyChart) Next appt:  None Dx:  FH: colon cancer; Gastroesophageal re...  Specimen Information: A: Small Intestine, Duodenum; Tissue    B: Stomach; Tissue    C: Esophagus, Distal; Tissue    D: Large Intestine, Cecum; Polyp    E: Large Intestine, Cecum; Tissue    F: Large Intestine; Polyp    G: Large Intestine; Tissue    H: Large Intestine, Rectum; Tissue        Component    Case Report   Surgical Pathology Report                         Case: [id number]                                   Authorizing Provider:  Adrian Key MD           Collected:           01/10/2020 09:49 AM           Ordering Location:     [facility]  Received:            01/10/2020 11:41 AM                                  ENDO SUITES                                                                   Pathologist:           Mayito Paulino MD                                                          Specimens:   1) - Small Intestine, Duodenum, DUODENUM                                                             2) - Stomach, RANDOM GASTRIC                                                                          3) - Esophagus, Distal, DISTAL ESOPHAGUS                                                             4) - Large Intestine, Cecum, CECAL POLYP                                                             5) - Large Intestine, Cecum, CECUM                                                                   6) - Large Intestine, POLYP AT 70CM                                                                  7) - Large Intestine, COLON AT 50CM                                                                  8) - Large Intestine, Rectum, RECTUM                                                       Final Diagnosis   1.  Duodenum Biopsy:  Benign small intestinal mucosa with               A. Normal crypt architecture.               B. No active cryptitis, crypt abscess formation nor granulomatous inflammation                    identified.     2.  Random Gastric Biopsy:   Benign gastric mucosa with                 A.  Minimal chronic inflammation.               B.  No Helicobacter-like organisms present by routine staining.                  3.  Distal Esophagus Biopsy:  Benign squamous and gastric mucosa with                A.  Minimal chronic inflammation without significant eosinophilia identified.                B.  Focal pancreatic heterotopic tissue noted with rare focus suspicious for intestinal metaplasia by                      routine staining (see comment).               C.  No glandular dysplasia nor malignancy identified.     4.  Cecal Polyp Biopsy:                 A.  Polypoid colonic mucosa with mild hyperplastic change and focal prominent lymphoid aggregate noted.                B.  No active inflammation nor granulomatous inflammation identified.               C.  No glandular dysplasia nor malignancy identified.      5.  Cecum Biopsy:   Benign gastric mucosa with                 A.  Normal crypt architecture with minimally increased intraepithelial lymphocytes noted.                 B.  No active inflammation nor granulomatous identified.                  6.  Colon Polyp at 70 cm Biopsy:                 A.  Focal developing hyperplastic polyp with active moderate chronic inflammation noted.                 B.  Focal active cryptitis with crypt abscess formation noted, no well developed granulomatous inflammation                     identified.               C.  No glandular dysplasia nor malignancy identified.      7.  Colon at 50 cm Biopsy:  Benign colonic mucosa with                A.  Relatively normal crypt architecture with lymphoid aggregates noted.                 B.  No active inflammation nor granulomatous inflammation identified.       8.  Rectum Biopsy: Benign colonic mucosa with                A. Mild hyperplastic change with focal increased muciphages noted.               B  No active inflammation nor granulomatous inflammation identified.               C. No glandular dysplasia nor malignancy identified.     jat/brb    Electronically signed by Mayito Paulino MD on 1/14/2020 at 1320   Preliminary result electronically signed by Mayito Paulino MD on 1/13/2020 at 1305   Comment    An alcian blue stain was performed on part 3 utilizing appropriate controls.  Focal strong acid mucin positivity is seen without definitive goblet cell metaplasia.  Focal heterotopic pancreatic tissue is noted. This biopsy was shared in internal consultation with Dr. Anderson, who concurred with the above diagnosis for that part.       jat/kurtb          Gross Description    1.The specimen is received in formalin labeled with the patient's name and further designated 'duodenum biopsy' are multiple small fragments of gray-tan tissue. The specimen is submitted for embedding as received.     2.The specimen is received in formalin labeled with the patient's name and further designated 'random gastric biopsy' are multiple small fragments of gray-tan tissue. The specimen is submitted for embedding as  received.     3.The specimen is received in formalin labeled with the patient's name and further designated 'distal esophagus biopsy' are multiple small fragments of gray-tan tissue. The specimen is submitted for embedding as received.     4.The specimen is received in formalin labeled with the patient's name and further designated 'cecal polyp biopsy' are multiple small fragments of gray-tan tissue. The specimen is submitted for embedding as received.     5.The specimen is received in formalin labeled with the patient's name and further designated 'cecum biopsy' are multiple small fragments of gray-tan tissue. The specimen is submitted for embedding as received.     6.The specimen is received in formalin labeled with the patient's name and further designated 'colon polyp @ 70 cm biopsy' is a small fragment of gray-tan tissue. The specimen is submitted for embedding as received.     7.The specimen is received in formalin labeled with the patient's name and further designated 'colon @ 50 cm biopsy' are multiple small fragments of gray-tan tissue. The specimen is submitted for embedding as received.     8.The specimen is received in formalin labeled with the patient's name and further designated 'rectum biopsy' are multiple small fragments of gray-tan tissue. The specimen is submitted for embedding as received.         Microscopic Description    Microscopic performed, incorporated in diagnosis.    Resulting Agency [facility]         Specimen Collected: 01/10/20 09:49 Last Resulted: 01/14/20 13:20 Order Details View Encounter Lab and Collection Details Routing Result History         Scans on Order [id number]    Document on 1/14/2020 1320 by Mayito Paulino MD          Result Communications      Result Notes     Adrian Key MD   1/15/2020  7:14 AM      01/15/20  Tell him that path from the EGD showed minimal inflammation in the stomach (but no evidence of helicobacter pylori) and minimal inflammation of the distal  esophagus (probably from GERD).        The colon polyps that were removed were not cancerous and not precancerous. There were biopsies that had some inflammation in them. Have him come see me in the office and we can discuss this and his diarrhea. In the meantime, if he is on NSAIDs have him stop them between now and when I see him in the office and we can see if his diarrhea resolves? FAX to his pcp.  x. [facility]                    History reviewed. No pertinent past medical history.    Past Surgical History:   Procedure Laterality Date   • COLONOSCOPY  02/08/2016    IH, no specimens collected   • COLONOSCOPY  08/28/2014    IH, tubular adenoma   • COLONOSCOPY N/A 1/10/2020    Procedure: COLONOSCOPY INTO CECUM AND TI WITH COLD BX POLYPECTOMIES, BIOPSIES;  Surgeon: Adrian Key MD;  Location: [facility] ENDOSCOPY;  Service: Gastroenterology   • CYST REMOVAL     • ENDOSCOPY N/A 1/10/2020    Procedure: ESOPHAGOGASTRODUODENOSCOPY WITH BIOPSIES;  Surgeon: Adrian Key MD;  Location: [facility] ENDOSCOPY;  Service: Gastroenterology         Current Outpatient Medications:   •  amoxicillin-clavulanate (AUGMENTIN) 875-125 MG per tablet, TK 1 T PO  Q 12 HOURS, Disp: , Rfl:   •  mesalamine (APRISO) 0.375 g 24 hr capsule, Take 4 po daily. #120 11 refills., Disp: 120 capsule, Rfl: 11    No Known Allergies    Family History   Problem Relation Age of Onset   • Colon cancer Mother    • Depression Mother    • Heart disease Father    • Diabetes Father    • Depression Father    • Glaucoma Father    • Ulcerative colitis Brother    • Colon cancer Maternal Grandfather        Social History     Socioeconomic History   • Marital status:      Spouse name: Not on file   • Number of children: Not on file   • Years of education: Not on file   • Highest education level: Not on file   Tobacco Use   • Smoking status: Current Every Day Smoker     Packs/day: 0.50     Types: Cigarettes   • Smokeless tobacco: Never Used   • Tobacco comment: Since age  19   Substance and Sexual Activity   • Alcohol use: Yes     Comment: social   • Drug use: No   • Sexual activity: Yes     Partners: Female       Review of Systems   Gastrointestinal: Negative for abdominal pain.     Pertinent positives and negatives documented in the HPI and all other systems reviewed and were found to be negative.  Vitals:    10/26/20 0825   BP: 135/70       Physical Exam  Vitals signs reviewed.   Constitutional:       General: He is not in acute distress.     Appearance: He is well-developed. He is not diaphoretic.   HENT:      Head: Normocephalic and atraumatic. Hair is normal.      Right Ear: Hearing, tympanic membrane, ear canal and external ear normal.      Left Ear: Hearing, tympanic membrane, ear canal and external ear normal.      Nose: No nasal deformity.      Mouth/Throat:      Mouth: No oral lesions.      Pharynx: Uvula midline. No uvula swelling.   Eyes:      General: Lids are normal. No scleral icterus.        Right eye: No discharge.         Left eye: No discharge.      Extraocular Movements:      Right eye: Normal extraocular motion and no nystagmus.      Left eye: Normal extraocular motion and no nystagmus.      Conjunctiva/sclera: Conjunctivae normal.      Pupils: Pupils are equal, round, and reactive to light.   Neck:      Musculoskeletal: Normal range of motion and neck supple.      Thyroid: No thyromegaly.      Vascular: No JVD.   Cardiovascular:      Rate and Rhythm: Normal rate and regular rhythm.      Pulses: Normal pulses.      Heart sounds: Normal heart sounds. No murmur. No gallop.    Pulmonary:      Effort: Pulmonary effort is normal. No respiratory distress.      Breath sounds: Normal breath sounds. No wheezing or rales.   Chest:      Chest wall: No tenderness.   Abdominal:      General: Bowel sounds are normal. There is no distension.      Palpations: Abdomen is soft. There is no mass.      Tenderness: There is no abdominal tenderness. There is no guarding.      Hernia:  No hernia is present.   Musculoskeletal: Normal range of motion.         General: No tenderness or deformity.   Lymphadenopathy:      Cervical: No cervical adenopathy.   Skin:     General: Skin is warm and dry.      Findings: No rash.   Neurological:      Mental Status: He is alert and oriented to person, place, and time.      Cranial Nerves: No cranial nerve deficit.      Motor: No abnormal muscle tone.      Coordination: Coordination normal.      Deep Tendon Reflexes: Reflexes are normal and symmetric. Reflexes normal.   Psychiatric:         Behavior: Behavior normal.         Thought Content: Thought content normal.         Judgment: Judgment normal.         Diagnoses and all orders for this visit:    1. Diarrhea, unspecified type (Primary)  -     Amylase  -     Lipase  -     CBC & Differential  -     Comprehensive Metabolic Panel  -     TSH    2. Dyspepsia  -     Amylase  -     Lipase  -     CBC & Differential  -     Comprehensive Metabolic Panel  -     TSH    3. Polyp of colon, unspecified part of colon, unspecified type  -     Amylase  -     Lipase  -     CBC & Differential  -     Comprehensive Metabolic Panel  -     TSH    4. FH: colon cancer  -     Amylase  -     Lipase  -     CBC & Differential  -     Comprehensive Metabolic Panel  -     TSH    5. Colitis  -     mesalamine (APRISO) 0.375 g 24 hr capsule; Take 4 po daily.  #120  11 refills.  Dispense: 120 capsule; Refill: 11      Assessment:  1. Diarrhea. His brother has UC  2. H//o colon polyps  3. FH (mom) colon cancer.    Recommendations:  1. Try stopping caffeine.  2. Repeat colonoscopy in 5 yrs.   3. Labs:   4. Trial of Mesalamine  5. F/u 8 weeks.     Return in about 8 weeks (around 12/21/2020).    Adrian Key MD  10/26/2020

## 2020-10-27 ENCOUNTER — TELEPHONE (OUTPATIENT)
Dept: GASTROENTEROLOGY | Facility: CLINIC | Age: 45
End: 2020-10-27

## 2020-10-27 NOTE — TELEPHONE ENCOUNTER
----- Message from Laura Manjarrez sent at 10/27/2020  8:58 AM EDT -----  Regarding: med  Contact: [phone]  Pt has a paper copy of script but it is not signed by doctor, stated doctor is suppose to have called in medication but it is not at pharmacy for the second day in a roll and pt wants to get this med.        mesalamine (APRISO) 0.375 g 24 hr capsule         Pharmacy:  [id number] - [place], [place] - [address] AT [address] - [phone]  - [phone]  Phone:  [phone]  Fax:  [phone]  Address:  [address], [facility] [address]

## 2020-10-27 NOTE — TELEPHONE ENCOUNTER
See o/v note of 10/27.     Call to [facility] and spoke with Maddie.  Apriso 0.375 g - take 4 tabs po daily, #120, R11.  Maddie R&V.     Call to pt.  Advise of above.  Verb understanding.

## 2020-11-30 ENCOUNTER — TELEPHONE (OUTPATIENT)
Dept: GASTROENTEROLOGY | Facility: CLINIC | Age: 45
End: 2020-11-30

## 2020-11-30 NOTE — TELEPHONE ENCOUNTER
Call to pt.  Advise per DR Key note.  Verb understanding.     Labs faxed via epic to DR Mayito Medina.

## 2020-11-30 NOTE — TELEPHONE ENCOUNTER
----- Message from Adrian Key MD sent at 11/15/2020  7:25 PM EST -----  11/15/20  Tell him that the labs look good. FAX to his PCP.   Luis bustillos

## 2021-04-06 ENCOUNTER — BULK ORDERING (OUTPATIENT)
Dept: CASE MANAGEMENT | Facility: OTHER | Age: 46
End: 2021-04-06

## 2021-04-06 DIAGNOSIS — Z23 IMMUNIZATION DUE: ICD-10-CM

## 2021-06-26 ENCOUNTER — HOSPITAL ENCOUNTER (EMERGENCY)
Facility: HOSPITAL | Age: 46
Discharge: HOME | End: 2021-06-26
Attending: EMERGENCY MEDICINE | Admitting: EMERGENCY MEDICINE

## 2021-06-26 VITALS
TEMPERATURE: 97.5 F | OXYGEN SATURATION: 98 % | BODY MASS INDEX: 23.62 KG/M2 | SYSTOLIC BLOOD PRESSURE: 119 MMHG | HEIGHT: 70 IN | HEART RATE: 69 BPM | DIASTOLIC BLOOD PRESSURE: 86 MMHG | RESPIRATION RATE: 18 BRPM | WEIGHT: 165 LBS

## 2021-06-26 DIAGNOSIS — S61.211A LACERATION OF LEFT INDEX FINGER WITHOUT FOREIGN BODY WITHOUT DAMAGE TO NAIL, INITIAL ENCOUNTER: Primary | ICD-10-CM

## 2021-06-26 PROCEDURE — 99282 EMERGENCY DEPT VISIT SF MDM: CPT

## 2021-06-26 RX ORDER — LIDOCAINE HYDROCHLORIDE 10 MG/ML
10 INJECTION, SOLUTION INFILTRATION; PERINEURAL ONCE
Status: DISCONTINUED | OUTPATIENT
Start: 2021-06-26 | End: 2021-06-26 | Stop reason: HOSPADM

## 2021-07-19 ENCOUNTER — OFFICE VISIT (OUTPATIENT)
Dept: GASTROENTEROLOGY | Facility: CLINIC | Age: 46
End: 2021-07-19

## 2021-07-19 VITALS
DIASTOLIC BLOOD PRESSURE: 88 MMHG | HEIGHT: 70 IN | SYSTOLIC BLOOD PRESSURE: 120 MMHG | WEIGHT: 175 LBS | BODY MASS INDEX: 25.05 KG/M2

## 2021-07-19 DIAGNOSIS — K63.5 POLYP OF COLON, UNSPECIFIED PART OF COLON, UNSPECIFIED TYPE: ICD-10-CM

## 2021-07-19 DIAGNOSIS — Z80.0 FH: COLON CANCER: ICD-10-CM

## 2021-07-19 DIAGNOSIS — K52.9 COLITIS: Primary | ICD-10-CM

## 2021-07-19 PROCEDURE — 99214 OFFICE O/P EST MOD 30 MIN: CPT | Performed by: INTERNAL MEDICINE

## 2021-07-19 RX ORDER — TADALAFIL 5 MG/1
TABLET ORAL
COMMUNITY
Start: 2021-07-14

## 2021-07-19 RX ORDER — PREDNISONE 10 MG/1
TABLET ORAL
Qty: 120 TABLET | Refills: 4 | Status: SHIPPED | OUTPATIENT
Start: 2021-07-19 | End: 2021-10-27

## 2021-07-19 NOTE — PROGRESS NOTES
Chief Complaint   Patient presents with   • Follow-up   • Diarrhea       History of Present Illness:   46 y.o. male  with a personal history of colonic polyps. His mom had colon cancer at 42 yrs of  age. His brother has ulcerative colitis. The patient has diarrhea.  I last saw the patient in 10 of 2020.  My assessment and plan was as follows:  Assessment:  1. Diarrhea. His brother has UC  2. H//o colon polyps  3. FH (mom) colon cancer.     Recommendations:  1. Try stopping caffeine.  2. Repeat colonoscopy in 5 yrs.   3. Labs:   4. Trial of Mesalamine  5. F/u 8 weeks.        He last had an EGD and colonoscopy in 1 of 2020.  He did try Apriso 0.375 g 4/day and it helps but still needs to take imodium 1-2 q am (he may have to take 3/day) and it really helps. He stopped it 2 weeks ago. He doesn't notice much difference on Apriso or off of it. He averages 2-4 BM/day. No rectal bleeding or melena. NO abdominal or chest pain. No nausea or vomiting. No fevers, chills, night sweats. His weight has been up.       History reviewed. No pertinent past medical history.    Past Surgical History:   Procedure Laterality Date   • COLONOSCOPY  02/08/2016    IH, no specimens collected   • COLONOSCOPY  08/28/2014    IH, tubular adenoma   • COLONOSCOPY N/A 1/10/2020    Procedure: COLONOSCOPY INTO CECUM AND TI WITH COLD BX POLYPECTOMIES, BIOPSIES;  Surgeon: Adrian Key MD;  Location: [facility] ENDOSCOPY;  Service: Gastroenterology   • CYST REMOVAL     • ENDOSCOPY N/A 1/10/2020    Procedure: ESOPHAGOGASTRODUODENOSCOPY WITH BIOPSIES;  Surgeon: Adrian Key MD;  Location: [facility] ENDOSCOPY;  Service: Gastroenterology         Current Outpatient Medications:   •  mesalamine (APRISO) 0.375 g 24 hr capsule, Take 4 po daily. #120 11 refills., Disp: 120 capsule, Rfl: 11  •  tadalafil (CIALIS) 5 MG tablet, , Disp: , Rfl:   •  predniSONE (DELTASONE) 10 MG tablet, Take 4 by mouth daily., Disp: 120 tablet, Rfl: 4    No Known  Allergies    Family History   Problem Relation Age of Onset   • Colon cancer Mother    • Depression Mother    • Heart disease Father    • Diabetes Father    • Depression Father    • Glaucoma Father    • Ulcerative colitis Brother    • Colon cancer Maternal Grandfather        Social History     Socioeconomic History   • Marital status:      Spouse name: Not on file   • Number of children: Not on file   • Years of education: Not on file   • Highest education level: Not on file   Tobacco Use   • Smoking status: Current Every Day Smoker     Packs/day: 0.50     Types: Cigarettes   • Smokeless tobacco: Never Used   • Tobacco comment: Since age 19   Substance and Sexual Activity   • Alcohol use: Yes     Comment: social   • Drug use: No   • Sexual activity: Yes     Partners: Female       Review of Systems   Gastrointestinal: Positive for diarrhea.     Pertinent positives and negatives documented in the HPI and all other systems reviewed and were found to be negative.  Vitals:    07/19/21 0843   BP: 120/88       Physical Exam  Vitals reviewed.   Constitutional:       General: He is not in acute distress.     Appearance: Normal appearance. He is well-developed. He is not diaphoretic.   HENT:      Head: Normocephalic and atraumatic. Hair is normal.      Right Ear: Hearing, tympanic membrane, ear canal and external ear normal.      Left Ear: Hearing, tympanic membrane, ear canal and external ear normal.      Nose: Nose normal. No nasal deformity.      Mouth/Throat:      Mouth: Mucous membranes are moist. No oral lesions.      Pharynx: Uvula midline. No uvula swelling.   Eyes:      General: Lids are normal. No scleral icterus.        Right eye: No discharge.         Left eye: No discharge.      Extraocular Movements: Extraocular movements intact.      Right eye: Normal extraocular motion and no nystagmus.      Left eye: Normal extraocular motion and no nystagmus.      Conjunctiva/sclera: Conjunctivae normal.      Pupils:  Pupils are equal, round, and reactive to light.   Neck:      Thyroid: No thyromegaly.      Vascular: No JVD.   Cardiovascular:      Rate and Rhythm: Normal rate and regular rhythm.      Pulses: Normal pulses.      Heart sounds: Normal heart sounds. No murmur heard.   No gallop.    Pulmonary:      Effort: Pulmonary effort is normal. No respiratory distress.      Breath sounds: Normal breath sounds. No wheezing or rales.   Chest:      Chest wall: No tenderness.   Abdominal:      General: Bowel sounds are normal. There is no distension.      Palpations: Abdomen is soft. There is no mass.      Tenderness: There is no abdominal tenderness. There is no guarding.      Hernia: No hernia is present.   Musculoskeletal:         General: No tenderness or deformity. Normal range of motion.      Cervical back: Normal range of motion and neck supple.   Lymphadenopathy:      Cervical: No cervical adenopathy.   Skin:     General: Skin is warm and dry.      Findings: No rash.   Neurological:      Mental Status: He is alert and oriented to person, place, and time.      Cranial Nerves: No cranial nerve deficit.      Motor: No abnormal muscle tone.      Coordination: Coordination normal.      Deep Tendon Reflexes: Reflexes are normal and symmetric. Reflexes normal.   Psychiatric:         Mood and Affect: Mood normal.         Behavior: Behavior normal.         Thought Content: Thought content normal.         Judgment: Judgment normal.         Diagnoses and all orders for this visit:    1. Colitis (Primary)  -     predniSONE (DELTASONE) 10 MG tablet; Take 4 by mouth daily.  Dispense: 120 tablet; Refill: 4    2. Polyp of colon, unspecified part of colon, unspecified type    3. FH: colon cancer      Assessment:  1. Diarrhea with colonoscopy showing some inflammation. His brother has UC. Could the patient have UC or crohn's colitis?  2. H/o colon polyps  3. FH (mom) colon cancer.     Recommendations:  1. Trial of prednisone 40 mg/day x 2  weeks. At the end of 2 weeks stop the med and call me. If the prednisone works then consider Humira  2. F/u 3 mos.     Return in about 3 months (around 10/19/2021).    Adrian Key MD  7/19/2021

## 2021-10-27 ENCOUNTER — OFFICE VISIT (OUTPATIENT)
Dept: GASTROENTEROLOGY | Facility: CLINIC | Age: 46
End: 2021-10-27

## 2021-10-27 VITALS — TEMPERATURE: 97.5 F | WEIGHT: 177 LBS | BODY MASS INDEX: 26.22 KG/M2 | HEIGHT: 69 IN

## 2021-10-27 DIAGNOSIS — F17.210 CIGARETTE SMOKER: ICD-10-CM

## 2021-10-27 DIAGNOSIS — Z86.010 HISTORY OF COLON POLYPS: ICD-10-CM

## 2021-10-27 DIAGNOSIS — R19.7 DIARRHEA, UNSPECIFIED TYPE: ICD-10-CM

## 2021-10-27 DIAGNOSIS — K21.9 GASTROESOPHAGEAL REFLUX DISEASE WITHOUT ESOPHAGITIS: Primary | ICD-10-CM

## 2021-10-27 DIAGNOSIS — Z80.0 FH: COLON CANCER: ICD-10-CM

## 2021-10-27 PROCEDURE — 99214 OFFICE O/P EST MOD 30 MIN: CPT | Performed by: NURSE PRACTITIONER

## 2021-10-27 RX ORDER — MONTELUKAST SODIUM 4 MG/1
TABLET, CHEWABLE ORAL
Qty: 60 TABLET | Refills: 11 | Status: SHIPPED | OUTPATIENT
Start: 2021-10-27 | End: 2022-01-24 | Stop reason: SDUPTHER

## 2021-10-27 NOTE — PROGRESS NOTES
Chief Complaint   Patient presents with   • Diarrhea       Otto Pantoja is a  46 y.o. male here for a follow up visit for diarrhea.     HPI  6-year-old male presents today for follow-up visit for diarrhea.  He is a patient of Dr. Key.  He was last seen in the office on 7/19/2021.  He is new to me today.  He has had an extensive work-up for his chronic diarrhea with fecal urgency.  He does have a family history of colon cancer with his mother and a brother with ulcerative colitis.  He had an EGD and colonoscopy on 1/2020 that did show some inflammation in his colon.  He has been on Apriso and prednisone without relief of his symptoms.  He does admit he drinks multiple cups of coffee every morning and smokes cigarettes.  He also does not eat the healthiest diet.  He tells me lately he is also been having a lot of reflux symptoms after eating.  He is taken the Nexium over-the-counter and it does help.  He admits he does not take it regularly.  He denies any dysphagia, nausea and vomiting, constipation, rectal bleeding or melena.  He admits his appetite is okay and his weight is up 12 pounds since June of this year.  He does have a history of adenomatous colon polyps.  He normally reports 2 or 3 loose stools throughout the day.  Definitely worse after he drinks his coffee or has a cigarette.  He has tried Imodium over-the-counter and it does work but then he ends up constipated afterwards.  History reviewed. No pertinent past medical history.    Past Surgical History:   Procedure Laterality Date   • COLONOSCOPY  02/08/2016    IH, no specimens collected   • COLONOSCOPY  08/28/2014    IH, tubular adenoma   • COLONOSCOPY N/A 1/10/2020    Procedure: COLONOSCOPY INTO CECUM AND TI WITH COLD BX POLYPECTOMIES, BIOPSIES;  Surgeon: Adrian Key MD;  Location: [facility] ENDOSCOPY;  Service: Gastroenterology   • CYST REMOVAL     • ENDOSCOPY N/A 1/10/2020    Procedure: ESOPHAGOGASTRODUODENOSCOPY WITH BIOPSIES;  Surgeon: Adrian Key,  MD;  Location: [facility] ENDOSCOPY;  Service: Gastroenterology       Scheduled Meds:    Continuous Infusions:No current facility-administered medications for this visit.      PRN Meds:.    No Known Allergies    Social History     Socioeconomic History   • Marital status:    Tobacco Use   • Smoking status: Current Every Day Smoker     Packs/day: 0.50     Types: Cigarettes   • Smokeless tobacco: Never Used   • Tobacco comment: Since age 19   Substance and Sexual Activity   • Alcohol use: Yes     Comment: social   • Drug use: No   • Sexual activity: Yes     Partners: Female       Family History   Problem Relation Age of Onset   • Colon cancer Mother    • Depression Mother    • Heart disease Father    • Diabetes Father    • Depression Father    • Glaucoma Father    • Ulcerative colitis Brother    • Colon cancer Maternal Grandfather        Review of Systems   Constitutional: Negative for appetite change, chills, diaphoresis, fatigue, fever and unexpected weight change.   HENT: Negative for nosebleeds, postnasal drip, sore throat, trouble swallowing and voice change.    Respiratory: Negative for cough, choking, chest tightness, shortness of breath, wheezing and stridor.    Cardiovascular: Negative for chest pain, palpitations and leg swelling.   Gastrointestinal: Positive for diarrhea. Negative for abdominal distention, abdominal pain, anal bleeding, blood in stool, constipation, nausea, rectal pain and vomiting.   Endocrine: Negative for polydipsia, polyphagia and polyuria.   Musculoskeletal: Negative for gait problem.   Skin: Negative for rash and wound.   Allergic/Immunologic: Negative for food allergies.   Neurological: Negative for dizziness, speech difficulty and light-headedness.   Psychiatric/Behavioral: Negative for confusion, self-injury, sleep disturbance and suicidal ideas.       Vitals:    10/27/21 0914   Temp: 97.5 °F (36.4 °C)       Physical Exam  Constitutional:       General: He is not in acute  distress.     Appearance: He is well-developed. He is not ill-appearing.   HENT:      Head: Normocephalic.   Eyes:      Pupils: Pupils are equal, round, and reactive to light.   Cardiovascular:      Rate and Rhythm: Normal rate and regular rhythm.      Heart sounds: Normal heart sounds.   Pulmonary:      Effort: Pulmonary effort is normal.      Breath sounds: Normal breath sounds.   Abdominal:      General: Bowel sounds are normal. There is no distension.      Palpations: Abdomen is soft. There is no mass.      Tenderness: There is no abdominal tenderness. There is no guarding or rebound.      Hernia: No hernia is present.   Musculoskeletal:         General: Normal range of motion.   Skin:     General: Skin is warm and dry.   Neurological:      Mental Status: He is alert and oriented to person, place, and time.   Psychiatric:         Speech: Speech normal.         Behavior: Behavior normal.         Judgment: Judgment normal.         No radiology results for the last 7 days     Diagnoses and all orders for this visit:    1. Gastroesophageal reflux disease without esophagitis (Primary)  Overview:  Added automatically from request for surgery [id number]      2. Diarrhea, unspecified type  Overview:  Added automatically from request for surgery [id number]      3. History of colon polyps    4. FH: colon cancer  Overview:  Added automatically from request for surgery [id number]      5. Cigarette smoker    Other orders  -     colestipol (COLESTID) 1 g tablet; Start with one pill daily and increase to 2 daily if no improvement after 2 weeks  Dispense: 60 tablet; Refill: 11  -     esomeprazole (nexIUM) 20 MG capsule; Take 1 capsule by mouth Every Morning Before Breakfast.  Dispense: 30 capsule; Refill: 5     Reviewed most recent EGD and colonoscopy results with him today.  Still having diarrhea with fecal urgency.  At this point since he did not have any improvement with the Apriso or the prednisone I think it is time to take a  different direction and try something like colestipol.  I am sure his coffee and cigarettes are only adding to his bowel issues.  Sounds like also to he is having some food triggers as well.  May need to consider gallbladder work-up if the colestipol does not help.  Recommend for his reflux symptoms that he got on the Nexium 20 mg daily at least for 3 months to help calm down the suspected gastritis he probably has going on.  At that point then hopefully we could taper him off of it.  Continue GERD precautions.  Next screening colonoscopy will be due in 2025.  Patient to call the office next week with an update.  Patient to follow-up with me in 3 months.  Patient is agreeable to the plan.

## 2022-01-24 ENCOUNTER — OFFICE VISIT (OUTPATIENT)
Dept: GASTROENTEROLOGY | Facility: CLINIC | Age: 47
End: 2022-01-24

## 2022-01-24 VITALS
HEIGHT: 70 IN | OXYGEN SATURATION: 98 % | WEIGHT: 184 LBS | HEART RATE: 85 BPM | TEMPERATURE: 97.5 F | BODY MASS INDEX: 26.34 KG/M2 | SYSTOLIC BLOOD PRESSURE: 149 MMHG | DIASTOLIC BLOOD PRESSURE: 89 MMHG

## 2022-01-24 DIAGNOSIS — K52.838 OTHER MICROSCOPIC COLITIS: Primary | ICD-10-CM

## 2022-01-24 DIAGNOSIS — K21.9 GASTROESOPHAGEAL REFLUX DISEASE WITHOUT ESOPHAGITIS: ICD-10-CM

## 2022-01-24 DIAGNOSIS — Z86.010 HISTORY OF COLON POLYPS: ICD-10-CM

## 2022-01-24 DIAGNOSIS — R19.7 DIARRHEA, UNSPECIFIED TYPE: ICD-10-CM

## 2022-01-24 DIAGNOSIS — Z80.0 FH: COLON CANCER: ICD-10-CM

## 2022-01-24 PROCEDURE — 99214 OFFICE O/P EST MOD 30 MIN: CPT | Performed by: INTERNAL MEDICINE

## 2022-01-24 RX ORDER — MONTELUKAST SODIUM 4 MG/1
TABLET, CHEWABLE ORAL
Qty: 60 TABLET | Refills: 11 | Status: SHIPPED | OUTPATIENT
Start: 2022-01-24 | End: 2022-04-28 | Stop reason: SDUPTHER

## 2022-01-24 NOTE — PROGRESS NOTES
Chief Complaint   Patient presents with   • Follow-up     colitis       History of Present Illness:   46 y.o. male        I last saw the patient in 7 of 2021.  My assessment and plan at that time was as follows:  Assessment:  1. Diarrhea with colonoscopy showing some inflammation. His brother has UC. Could the patient have UC or crohn's colitis?  2. H/o colon polyps  3. FH (mom) colon cancer.     Recommendations:  1. Trial of prednisone 40 mg/day x 2 weeks. At the end of 2 weeks stop the med and call me. If the prednisone works then consider Humira  2. F/u 3 mos.        Ms. Vance saw him in 10 of 2021 and tried him on colestipol.       He last had an EGD and colonoscopy in 10 of 2020.  My comments after reviewing pathology were:  01/15/20:  Tell him that path from the EGD showed minimal inflammation in the stomach (but no evidence of helicobacter pylori) and minimal inflammation of the distal esophagus (probably from GERD).        The colon polyps that were removed were not cancerous and not precancerous. There were biopsies that had some inflammation in them. Have him come see me in the office and we can discuss this and his diarrhea. In the meantime, if he is on NSAIDs have him stop them between now and when I see him in the office and we can see if his diarrhea resolves?        The prednisone didn't help but the colestipol did help. He usually takes colestipol 2/AM and may take a third. He averages 1 BM/day. No rectal bleeding or melena. No abdominal or chest pain. No nausea or vomitting.        He takes Nexium 20 mg po daily, he may take 2/week average. No dysphagia. No chest pain. His weight has gone up.   History reviewed. No pertinent past medical history.    Past Surgical History:   Procedure Laterality Date   • COLONOSCOPY  02/08/2016    IH, no specimens collected   • COLONOSCOPY  08/28/2014    IH, tubular adenoma   • COLONOSCOPY N/A 1/10/2020    Procedure: COLONOSCOPY INTO CECUM AND TI WITH COLD  BX POLYPECTOMIES, BIOPSIES;  Surgeon: Adrian Key MD;  Location: [facility] ENDOSCOPY;  Service: Gastroenterology   • CYST REMOVAL     • ENDOSCOPY N/A 1/10/2020    Procedure: ESOPHAGOGASTRODUODENOSCOPY WITH BIOPSIES;  Surgeon: Adrian Key MD;  Location: [facility] ENDOSCOPY;  Service: Gastroenterology         Current Outpatient Medications:   •  colestipol (COLESTID) 1 g tablet, Take 2 pills po BID. #360 with 3 refills., Disp: 60 tablet, Rfl: 11  •  esomeprazole (nexIUM) 20 MG capsule, Take 1 capsule by mouth Every Morning Before Breakfast., Disp: 90 capsule, Rfl: 3  •  tadalafil (CIALIS) 5 MG tablet, , Disp: , Rfl:     No Known Allergies    Family History   Problem Relation Age of Onset   • Colon cancer Mother    • Depression Mother    • Heart disease Father    • Diabetes Father    • Depression Father    • Glaucoma Father    • Ulcerative colitis Brother    • Colon cancer Maternal Grandfather        Social History     Socioeconomic History   • Marital status:    Tobacco Use   • Smoking status: Current Every Day Smoker     Packs/day: 0.50     Types: Cigarettes   • Smokeless tobacco: Never Used   • Tobacco comment: Since age 19   Substance and Sexual Activity   • Alcohol use: Yes     Comment: social   • Drug use: No   • Sexual activity: Yes     Partners: Female       Review of Systems   Gastrointestinal: Negative for abdominal pain.   All other systems reviewed and are negative.    Pertinent positives and negatives documented in the HPI and all other systems reviewed and were found to be negative.  Vitals:    01/24/22 0805   BP: 149/89   Pulse: 85   Temp: 97.5 °F (36.4 °C)   SpO2: 98%       Physical Exam  Vitals reviewed.   Constitutional:       General: He is not in acute distress.     Appearance: Normal appearance. He is well-developed. He is not diaphoretic.   HENT:      Head: Normocephalic and atraumatic. Hair is normal.      Right Ear: Hearing, tympanic membrane, ear canal and external ear normal.      Left  Ear: Hearing, tympanic membrane, ear canal and external ear normal.      Nose: Nose normal. No nasal deformity.      Mouth/Throat:      Mouth: Mucous membranes are moist. No oral lesions.      Pharynx: Uvula midline. No uvula swelling.   Eyes:      General: Lids are normal. No scleral icterus.        Right eye: No discharge.         Left eye: No discharge.      Extraocular Movements: Extraocular movements intact.      Right eye: Normal extraocular motion and no nystagmus.      Left eye: Normal extraocular motion and no nystagmus.      Conjunctiva/sclera: Conjunctivae normal.      Pupils: Pupils are equal, round, and reactive to light.   Neck:      Thyroid: No thyromegaly.      Vascular: No JVD.   Cardiovascular:      Rate and Rhythm: Normal rate and regular rhythm.      Pulses: Normal pulses.      Heart sounds: Normal heart sounds. No murmur heard.  No gallop.    Pulmonary:      Effort: Pulmonary effort is normal. No respiratory distress.      Breath sounds: Normal breath sounds. No wheezing or rales.   Chest:      Chest wall: No tenderness.   Abdominal:      General: Bowel sounds are normal. There is no distension.      Palpations: Abdomen is soft. There is no mass.      Tenderness: There is no abdominal tenderness. There is no guarding.      Hernia: No hernia is present.   Musculoskeletal:         General: No tenderness or deformity. Normal range of motion.      Cervical back: Normal range of motion and neck supple.   Lymphadenopathy:      Cervical: No cervical adenopathy.   Skin:     General: Skin is warm and dry.      Findings: No rash.   Neurological:      Mental Status: He is alert and oriented to person, place, and time.      Cranial Nerves: No cranial nerve deficit.      Motor: No abnormal muscle tone.      Coordination: Coordination normal.      Deep Tendon Reflexes: Reflexes are normal and symmetric. Reflexes normal.   Psychiatric:         Mood and Affect: Mood normal.         Behavior: Behavior normal.          Thought Content: Thought content normal.         Judgment: Judgment normal.         Diagnoses and all orders for this visit:    1. Other microscopic colitis (Primary)  -     colestipol (COLESTID) 1 g tablet; Take 2 pills po BID.  #360 with 3 refills.  Dispense: 60 tablet; Refill: 11  -     CBC & Differential  -     Celiac Ab tTG DGP TIgA  -     Comprehensive Metabolic Panel  -     TSH  -     C-reactive Protein  -     Sedimentation Rate    2. Gastroesophageal reflux disease without esophagitis  -     esomeprazole (nexIUM) 20 MG capsule; Take 1 capsule by mouth Every Morning Before Breakfast.  Dispense: 90 capsule; Refill: 3  -     CBC & Differential  -     Celiac Ab tTG DGP TIgA  -     Comprehensive Metabolic Panel  -     TSH  -     C-reactive Protein  -     Sedimentation Rate    3. Diarrhea, unspecified type  -     CBC & Differential  -     Celiac Ab tTG DGP TIgA  -     Comprehensive Metabolic Panel  -     TSH  -     C-reactive Protein  -     Sedimentation Rate    4. History of colon polyps  -     CBC & Differential  -     Celiac Ab tTG DGP TIgA  -     Comprehensive Metabolic Panel  -     TSH  -     C-reactive Protein  -     Sedimentation Rate    5. FH: colon cancer      Assessment:  1.  Diarrhea with colonoscopy showing some inflammation. His brother has UC. Could the patient have microscopic colitis, UC or crohn's colitis? This may be microscopic colitis at this time because he is responding to colestipol and didn't respond to steroids.   2. H/o colon polyps  3. FH (mom) colon cancer.    Recommendations:  1. Continue Colestipol  2. F/u 6mos.   3. Labs.    Return in about 6 months (around 7/24/2022).    Adrian Key MD  1/24/2022

## 2022-01-25 LAB
ALBUMIN SERPL-MCNC: 4.6 G/DL (ref 4–5)
ALBUMIN/GLOB SERPL: 2.3 {RATIO} (ref 1.2–2.2)
ALP SERPL-CCNC: 73 IU/L (ref 44–121)
ALT SERPL-CCNC: 17 IU/L (ref 0–44)
AST SERPL-CCNC: 15 IU/L (ref 0–40)
BASOPHILS # BLD AUTO: 0.1 X10E3/UL (ref 0–0.2)
BASOPHILS NFR BLD AUTO: 1 %
BILIRUB SERPL-MCNC: 0.7 MG/DL (ref 0–1.2)
BUN SERPL-MCNC: 11 MG/DL (ref 6–24)
BUN/CREAT SERPL: 14 (ref 9–20)
CALCIUM SERPL-MCNC: 9.5 MG/DL (ref 8.7–10.2)
CHLORIDE SERPL-SCNC: 102 MMOL/L (ref 96–106)
CO2 SERPL-SCNC: 24 MMOL/L (ref 20–29)
CREAT SERPL-MCNC: 0.81 MG/DL (ref 0.76–1.27)
CRP SERPL-MCNC: <1 MG/L (ref 0–10)
EOSINOPHIL # BLD AUTO: 0.2 X10E3/UL (ref 0–0.4)
EOSINOPHIL NFR BLD AUTO: 3 %
ERYTHROCYTE [DISTWIDTH] IN BLOOD BY AUTOMATED COUNT: 12.1 % (ref 11.6–15.4)
ERYTHROCYTE [SEDIMENTATION RATE] IN BLOOD BY WESTERGREN METHOD: 2 MM/HR (ref 0–15)
GLIADIN PEPTIDE IGA SER-ACNC: 7 UNITS (ref 0–19)
GLIADIN PEPTIDE IGG SER-ACNC: 2 UNITS (ref 0–19)
GLOBULIN SER CALC-MCNC: 2 G/DL (ref 1.5–4.5)
GLUCOSE SERPL-MCNC: 99 MG/DL (ref 65–99)
HCT VFR BLD AUTO: 47.2 % (ref 37.5–51)
HGB BLD-MCNC: 16 G/DL (ref 13–17.7)
IGA SERPL-MCNC: 100 MG/DL (ref 90–386)
IMM GRANULOCYTES # BLD AUTO: 0 X10E3/UL (ref 0–0.1)
IMM GRANULOCYTES NFR BLD AUTO: 0 %
LYMPHOCYTES # BLD AUTO: 2.7 X10E3/UL (ref 0.7–3.1)
LYMPHOCYTES NFR BLD AUTO: 34 %
MCH RBC QN AUTO: 30.4 PG (ref 26.6–33)
MCHC RBC AUTO-ENTMCNC: 33.9 G/DL (ref 31.5–35.7)
MCV RBC AUTO: 90 FL (ref 79–97)
MONOCYTES # BLD AUTO: 0.5 X10E3/UL (ref 0.1–0.9)
MONOCYTES NFR BLD AUTO: 7 %
NEUTROPHILS # BLD AUTO: 4.3 X10E3/UL (ref 1.4–7)
NEUTROPHILS NFR BLD AUTO: 55 %
PLATELET # BLD AUTO: 279 X10E3/UL (ref 150–450)
POTASSIUM SERPL-SCNC: 4.1 MMOL/L (ref 3.5–5.2)
PROT SERPL-MCNC: 6.6 G/DL (ref 6–8.5)
RBC # BLD AUTO: 5.27 X10E6/UL (ref 4.14–5.8)
SODIUM SERPL-SCNC: 142 MMOL/L (ref 134–144)
TSH SERPL-ACNC: 1.99 UIU/ML (ref 0.45–4.5)
TTG IGA SER-ACNC: <2 U/ML (ref 0–3)
TTG IGG SER-ACNC: <2 U/ML (ref 0–5)
WBC # BLD AUTO: 7.9 X10E3/UL (ref 3.4–10.8)

## 2022-01-27 ENCOUNTER — TELEPHONE (OUTPATIENT)
Dept: GASTROENTEROLOGY | Facility: CLINIC | Age: 47
End: 2022-01-27

## 2022-01-27 NOTE — TELEPHONE ENCOUNTER
----- Message from Adrian Key MD sent at 1/27/2022  7:57 AM EST -----  01/27/22       Tell him that his lab work looks good.  Send a copy of this report to his PCP.  Luis bustillos

## 2022-01-27 NOTE — PROGRESS NOTES
01/27/22       Tell him that his lab work looks good.  Send a copy of this report to his PCP.  Luis bustillos

## 2022-04-28 ENCOUNTER — TELEPHONE (OUTPATIENT)
Dept: GASTROENTEROLOGY | Facility: CLINIC | Age: 47
End: 2022-04-28

## 2022-04-28 DIAGNOSIS — K52.838 OTHER MICROSCOPIC COLITIS: ICD-10-CM

## 2022-04-28 RX ORDER — MONTELUKAST SODIUM 4 MG/1
TABLET, CHEWABLE ORAL
Qty: 360 TABLET | Refills: 3 | Status: SHIPPED | OUTPATIENT
Start: 2022-04-28

## 2022-04-28 RX ORDER — MONTELUKAST SODIUM 4 MG/1
TABLET, CHEWABLE ORAL
Qty: 360 TABLET | Refills: 3 | Status: SHIPPED | OUTPATIENT
Start: 2022-04-28 | End: 2022-04-28 | Stop reason: SDUPTHER

## 2022-04-28 NOTE — TELEPHONE ENCOUNTER
Received fax from [facility] requesting refill on colestipol 1gm take 2 pills po bid #[id number] 3 refills.     Called pt and pt states he does need a refill . He reports that it needs to be a 90 day supply sent to his [facility]. ADvised pt we will send his script.  Script sent . Pt verb understanding.

## 2023-02-07 ENCOUNTER — APPOINTMENT (OUTPATIENT)
Dept: CT IMAGING | Facility: HOSPITAL | Age: 48
End: 2023-02-07
Payer: COMMERCIAL

## 2023-02-07 ENCOUNTER — HOSPITAL ENCOUNTER (EMERGENCY)
Facility: HOSPITAL | Age: 48
Discharge: HOME | End: 2023-02-07
Attending: EMERGENCY MEDICINE | Admitting: EMERGENCY MEDICINE
Payer: COMMERCIAL

## 2023-02-07 ENCOUNTER — APPOINTMENT (OUTPATIENT)
Dept: GENERAL RADIOLOGY | Facility: HOSPITAL | Age: 48
End: 2023-02-07
Payer: COMMERCIAL

## 2023-02-07 VITALS
SYSTOLIC BLOOD PRESSURE: 130 MMHG | OXYGEN SATURATION: 99 % | TEMPERATURE: 97.8 F | WEIGHT: 180 LBS | BODY MASS INDEX: 25.77 KG/M2 | RESPIRATION RATE: 18 BRPM | DIASTOLIC BLOOD PRESSURE: 92 MMHG | HEIGHT: 70 IN | HEART RATE: 77 BPM

## 2023-02-07 DIAGNOSIS — F07.81 POSTCONCUSSION SYNDROME: Primary | ICD-10-CM

## 2023-02-07 LAB
ALBUMIN SERPL-MCNC: 4.8 G/DL (ref 3.5–5.2)
ALBUMIN/GLOB SERPL: 2.1 G/DL
ALP SERPL-CCNC: 75 U/L (ref 39–117)
ALT SERPL W P-5'-P-CCNC: 26 U/L (ref 1–41)
ANION GAP SERPL CALCULATED.3IONS-SCNC: 10.2 MMOL/L (ref 5–15)
AST SERPL-CCNC: 22 U/L (ref 1–40)
BASOPHILS # BLD AUTO: 0.06 10*3/MM3 (ref 0–0.2)
BASOPHILS NFR BLD AUTO: 0.6 % (ref 0–1.5)
BILIRUB SERPL-MCNC: 1.2 MG/DL (ref 0–1.2)
BUN SERPL-MCNC: 9 MG/DL (ref 6–20)
BUN/CREAT SERPL: 9.4 (ref 7–25)
CALCIUM SPEC-SCNC: 9.8 MG/DL (ref 8.6–10.5)
CHLORIDE SERPL-SCNC: 104 MMOL/L (ref 98–107)
CO2 SERPL-SCNC: 25.8 MMOL/L (ref 22–29)
CREAT SERPL-MCNC: 0.96 MG/DL (ref 0.76–1.27)
DEPRECATED RDW RBC AUTO: 38.2 FL (ref 37–54)
EGFRCR SERPLBLD CKD-EPI 2021: 98.7 ML/MIN/1.73
EOSINOPHIL # BLD AUTO: 0.06 10*3/MM3 (ref 0–0.4)
EOSINOPHIL NFR BLD AUTO: 0.6 % (ref 0.3–6.2)
ERYTHROCYTE [DISTWIDTH] IN BLOOD BY AUTOMATED COUNT: 12 % (ref 12.3–15.4)
GLOBULIN UR ELPH-MCNC: 2.3 GM/DL
GLUCOSE SERPL-MCNC: 110 MG/DL (ref 65–99)
HCT VFR BLD AUTO: 48 % (ref 37.5–51)
HGB BLD-MCNC: 16.9 G/DL (ref 13–17.7)
IMM GRANULOCYTES # BLD AUTO: 0.05 10*3/MM3 (ref 0–0.05)
IMM GRANULOCYTES NFR BLD AUTO: 0.5 % (ref 0–0.5)
LYMPHOCYTES # BLD AUTO: 2.29 10*3/MM3 (ref 0.7–3.1)
LYMPHOCYTES NFR BLD AUTO: 24.2 % (ref 19.6–45.3)
MAGNESIUM SERPL-MCNC: 2.2 MG/DL (ref 1.6–2.6)
MCH RBC QN AUTO: 30.7 PG (ref 26.6–33)
MCHC RBC AUTO-ENTMCNC: 35.2 G/DL (ref 31.5–35.7)
MCV RBC AUTO: 87.1 FL (ref 79–97)
MONOCYTES # BLD AUTO: 0.46 10*3/MM3 (ref 0.1–0.9)
MONOCYTES NFR BLD AUTO: 4.9 % (ref 5–12)
NEUTROPHILS NFR BLD AUTO: 6.53 10*3/MM3 (ref 1.7–7)
NEUTROPHILS NFR BLD AUTO: 69.2 % (ref 42.7–76)
NRBC BLD AUTO-RTO: 0 /100 WBC (ref 0–0.2)
PLATELET # BLD AUTO: 286 10*3/MM3 (ref 140–450)
PMV BLD AUTO: 9.3 FL (ref 6–12)
POTASSIUM SERPL-SCNC: 4.4 MMOL/L (ref 3.5–5.2)
PROT SERPL-MCNC: 7.1 G/DL (ref 6–8.5)
QT INTERVAL: 370 MS
RBC # BLD AUTO: 5.51 10*6/MM3 (ref 4.14–5.8)
SODIUM SERPL-SCNC: 140 MMOL/L (ref 136–145)
TROPONIN T SERPL HS-MCNC: <6 NG/L
WBC NRBC COR # BLD: 9.45 10*3/MM3 (ref 3.4–10.8)

## 2023-02-07 PROCEDURE — 93005 ELECTROCARDIOGRAM TRACING: CPT | Performed by: PHYSICIAN ASSISTANT

## 2023-02-07 PROCEDURE — 83735 ASSAY OF MAGNESIUM: CPT | Performed by: PHYSICIAN ASSISTANT

## 2023-02-07 PROCEDURE — 93010 ELECTROCARDIOGRAM REPORT: CPT | Performed by: INTERNAL MEDICINE

## 2023-02-07 PROCEDURE — 85025 COMPLETE CBC W/AUTO DIFF WBC: CPT | Performed by: PHYSICIAN ASSISTANT

## 2023-02-07 PROCEDURE — 84484 ASSAY OF TROPONIN QUANT: CPT | Performed by: PHYSICIAN ASSISTANT

## 2023-02-07 PROCEDURE — 99283 EMERGENCY DEPT VISIT LOW MDM: CPT

## 2023-02-07 PROCEDURE — 71046 X-RAY EXAM CHEST 2 VIEWS: CPT

## 2023-02-07 PROCEDURE — 96360 HYDRATION IV INFUSION INIT: CPT

## 2023-02-07 PROCEDURE — 70450 CT HEAD/BRAIN W/O DYE: CPT

## 2023-02-07 PROCEDURE — 80053 COMPREHEN METABOLIC PANEL: CPT | Performed by: PHYSICIAN ASSISTANT

## 2023-02-07 RX ORDER — ATORVASTATIN CALCIUM 40 MG/1
40 TABLET, FILM COATED ORAL DAILY
COMMUNITY

## 2023-02-07 RX ADMIN — SODIUM CHLORIDE 1000 ML: 9 INJECTION, SOLUTION INTRAVENOUS at 14:45

## 2023-02-07 NOTE — ED NOTES
"Pt states that he fell on Saturday evening when getting up from the toilet into the shower. Pt states he does \"not remember passing out.\" Pt states that he hit his head on the shower wall. Pt states the back of his head may have a knot on it. Pt states he was laying there for a few min but then got up and went to bed. Pt states yesterday he started feeling off. Pt states today when he woke up he felt \"foggy\". Pt states that he is able to see okay but when he first gets up he feels light-headed and his balance can be off at times. Pt denies any headaches.   "

## 2023-02-07 NOTE — ED NOTES
Patient had a slip and fall while getting off of the toilet Saturday and hit the back of his head. Patient is c/o fogginess. Unsure of LOC. Denies blood thinners.

## 2023-02-07 NOTE — DISCHARGE INSTRUCTIONS
Follow-up closely with your primary care provider for reevaluation in the next few days.  Return to the emergency department if your symptoms or condition worsen.

## 2023-02-07 NOTE — ED PROVIDER NOTES
EMERGENCY DEPARTMENT ENCOUNTER    Room Number:  S04/04  Date seen:  2/7/2023  PCP: Mayito Medina MD  Historian: Patient      HPI:  Chief Complaint: Fuzzy headedness after head injury    Context: Otto Pantoja is a 47 y.o. male who presents to the ED c/o fuzzy headedness after he had an episode today before yesterday where he was using the toilet and when he bent down to pull his pants up and stood up quickly, he had a near syncopal episode falling and hitting his head on the wall of the shower.  He does not believe he lost consciousness but could not say for sure.  Since then he has been somewhat fuzzy headed and although he does not have any headaches has not felt like his concentration was at baseline.  Patient denies any current pain, nausea, vomiting, blurry vision, numbness, tingling, weakness, neck pain, or headache.            PAST MEDICAL HISTORY  Active Ambulatory Problems     Diagnosis Date Noted   • Cigarette smoker 07/06/2017   • History of colon polyps 07/06/2017   • Diarrhea 10/23/2019   • Polyp of colon 10/23/2019   • FH: colon cancer 10/23/2019   • Gastroesophageal reflux disease 10/23/2019     Resolved Ambulatory Problems     Diagnosis Date Noted   • No Resolved Ambulatory Problems     No Additional Past Medical History         PAST SURGICAL HISTORY  Past Surgical History:   Procedure Laterality Date   • COLONOSCOPY  02/08/2016    IH, no specimens collected   • COLONOSCOPY  08/28/2014    IH, tubular adenoma   • COLONOSCOPY N/A 1/10/2020    Procedure: COLONOSCOPY INTO CECUM AND TI WITH COLD BX POLYPECTOMIES, BIOPSIES;  Surgeon: Adrian Key MD;  Location: [facility] ENDOSCOPY;  Service: Gastroenterology   • CYST REMOVAL     • ENDOSCOPY N/A 1/10/2020    Procedure: ESOPHAGOGASTRODUODENOSCOPY WITH BIOPSIES;  Surgeon: Adrian Key MD;  Location: [facility] ENDOSCOPY;  Service: Gastroenterology         FAMILY HISTORY  Family History   Problem Relation Age of Onset   • Colon cancer Mother    • Depression  Mother    • Heart disease Father    • Diabetes Father    • Depression Father    • Glaucoma Father    • Ulcerative colitis Brother    • Colon cancer Maternal Grandfather          SOCIAL HISTORY  Social History     Socioeconomic History   • Marital status:    Tobacco Use   • Smoking status: Every Day     Packs/day: 0.50     Types: Cigarettes   • Smokeless tobacco: Never   • Tobacco comments:     Since age 19   Substance and Sexual Activity   • Alcohol use: Yes     Comment: social   • Drug use: No   • Sexual activity: Yes     Partners: Female         ALLERGIES  Patient has no known allergies.        REVIEW OF SYSTEMS  Review of Systems   Constitutional: Positive for appetite change (He has not been as hungry since the accident). Negative for fatigue.   Eyes: Negative for visual disturbance.   Respiratory: Negative for shortness of breath.    Cardiovascular: Negative for chest pain.   Gastrointestinal: Negative for abdominal pain.   Musculoskeletal: Negative for back pain, joint swelling, neck pain and neck stiffness.   Skin: Negative for color change, pallor, rash and wound.   Neurological: Negative for dizziness, syncope, weakness, light-headedness, numbness and headaches.   Psychiatric/Behavioral: Positive for decreased concentration. Negative for confusion and sleep disturbance.            PHYSICAL EXAM  ED Triage Vitals   Temp Heart Rate Resp BP SpO2   02/07/23 1348 02/07/23 1348 02/07/23 1348 02/07/23 1348 02/07/23 1348   97.8 °F (36.6 °C) 94 18 (!) 168/115 97 %      Temp src Heart Rate Source Patient Position BP Location FiO2 (%)   -- 02/07/23 1410 02/07/23 1410 02/07/23 1410 --    Monitor Sitting Left arm        Physical Exam  Constitutional:       General: He is not in acute distress.     Appearance: Normal appearance. He is normal weight. He is not ill-appearing or diaphoretic.   HENT:      Head: Normocephalic and atraumatic.      Right Ear: Tympanic membrane, ear canal and external ear normal.       Left Ear: Tympanic membrane, ear canal and external ear normal.      Nose: Nose normal.      Mouth/Throat:      Mouth: Mucous membranes are dry.   Eyes:      Extraocular Movements: Extraocular movements intact.      Pupils: Pupils are equal, round, and reactive to light.   Cardiovascular:      Rate and Rhythm: Normal rate and regular rhythm.      Pulses: Normal pulses.      Heart sounds: Normal heart sounds.   Pulmonary:      Effort: Pulmonary effort is normal.      Breath sounds: Normal breath sounds.   Musculoskeletal:         General: No swelling, tenderness, deformity or signs of injury. Normal range of motion.   Skin:     General: Skin is warm and dry.      Findings: No bruising or lesion.   Neurological:      General: No focal deficit present.      Mental Status: He is alert and oriented to person, place, and time.      Cranial Nerves: No cranial nerve deficit.      Sensory: No sensory deficit.      Motor: No weakness.      Coordination: Coordination normal.   Psychiatric:         Mood and Affect: Mood normal.         Behavior: Behavior normal.         Thought Content: Thought content normal.         Judgment: Judgment normal.               Vital signs and nursing notes reviewed.          LAB RESULTS  Recent Results (from the past 24 hour(s))   Comprehensive Metabolic Panel    Collection Time: 02/07/23  2:50 PM    Specimen: Arm, Left; Blood   Result Value Ref Range    Glucose 110 (H) 65 - 99 mg/dL    BUN 9 6 - 20 mg/dL    Creatinine 0.96 0.76 - 1.27 mg/dL    Sodium 140 136 - 145 mmol/L    Potassium 4.4 3.5 - 5.2 mmol/L    Chloride 104 98 - 107 mmol/L    CO2 25.8 22.0 - 29.0 mmol/L    Calcium 9.8 8.6 - 10.5 mg/dL    Total Protein 7.1 6.0 - 8.5 g/dL    Albumin 4.8 3.5 - 5.2 g/dL    ALT (SGPT) 26 1 - 41 U/L    AST (SGOT) 22 1 - 40 U/L    Alkaline Phosphatase 75 39 - 117 U/L    Total Bilirubin 1.2 0.0 - 1.2 mg/dL    Globulin 2.3 gm/dL    A/G Ratio 2.1 g/dL    BUN/Creatinine Ratio 9.4 7.0 - 25.0    Anion Gap 10.2  5.0 - 15.0 mmol/L    eGFR 98.7 >60.0 mL/min/1.73   Troponin    Collection Time: 02/07/23  2:50 PM    Specimen: Arm, Left; Blood   Result Value Ref Range    HS Troponin T <6 <15 ng/L   Magnesium    Collection Time: 02/07/23  2:50 PM    Specimen: Arm, Left; Blood   Result Value Ref Range    Magnesium 2.2 1.6 - 2.6 mg/dL   CBC Auto Differential    Collection Time: 02/07/23  2:50 PM    Specimen: Arm, Left; Blood   Result Value Ref Range    WBC 9.45 3.40 - 10.80 10*3/mm3    RBC 5.51 4.14 - 5.80 10*6/mm3    Hemoglobin 16.9 13.0 - 17.7 g/dL    Hematocrit 48.0 37.5 - 51.0 %    MCV 87.1 79.0 - 97.0 fL    MCH 30.7 26.6 - 33.0 pg    MCHC 35.2 31.5 - 35.7 g/dL    RDW 12.0 (L) 12.3 - 15.4 %    RDW-SD 38.2 37.0 - 54.0 fl    MPV 9.3 6.0 - 12.0 fL    Platelets 286 140 - 450 10*3/mm3    Neutrophil % 69.2 42.7 - 76.0 %    Lymphocyte % 24.2 19.6 - 45.3 %    Monocyte % 4.9 (L) 5.0 - 12.0 %    Eosinophil % 0.6 0.3 - 6.2 %    Basophil % 0.6 0.0 - 1.5 %    Immature Grans % 0.5 0.0 - 0.5 %    Neutrophils, Absolute 6.53 1.70 - 7.00 10*3/mm3    Lymphocytes, Absolute 2.29 0.70 - 3.10 10*3/mm3    Monocytes, Absolute 0.46 0.10 - 0.90 10*3/mm3    Eosinophils, Absolute 0.06 0.00 - 0.40 10*3/mm3    Basophils, Absolute 0.06 0.00 - 0.20 10*3/mm3    Immature Grans, Absolute 0.05 0.00 - 0.05 10*3/mm3    nRBC 0.0 0.0 - 0.2 /100 WBC   ECG 12 Lead Syncope    Collection Time: 02/07/23  3:14 PM   Result Value Ref Range    QT Interval 370 ms       Ordered the above labs and reviewed the results.        RADIOLOGY  XR Chest 2 View    Result Date: 2/7/2023  XR CHEST 2 VW-  HISTORY: Male who is 47 years-old,  near syncope  TECHNIQUE: Frontal and lateral views of the chest  COMPARISON:  image from CT from 11/04/2019  FINDINGS: Heart, mediastinum and pulmonary vasculature are unremarkable. No focal pulmonary consolidation, pleural effusion, or pneumothorax. No acute osseous process.      No evidence for acute pulmonary process. Follow-up as clinical  indications persist.  This report was finalized on 2/7/2023 3:15 PM by Dr. Sammy White M.D.      CT Head Without Contrast    Result Date: 2/7/2023  CT HEAD WITHOUT CONTRAST  CLINICAL HISTORY: Patient fell 3 days ago. Lightheadedness and off-balance.  TECHNIQUE: CT scan of the head was obtained with 3 mm axial soft tissue and 2 mm axial bone algorithm algorithm images. No intravenous contrast was administered. Sagittal and coronal reconstructions were obtained.  COMPARISON: CT head dated 11/16/2006.  FINDINGS:   There is no evidence for a calvarial fracture. There is no evidence for an acute extra-axial hemorrhage.  The ventricles, sulci, and cisterns are age-appropriate. The basal ganglia and thalami are unremarkable in appearance. The posterior fossa structures are within normal limits. The gray-white matter differentiation is unremarkable.       No evidence for acute traumatic intracranial pathology.   Radiation dose reduction techniques were utilized, including automated exposure control and exposure modulation based on body size.         Ordered the above noted radiological studies. Reviewed by me in PACS.            PROCEDURES  Procedures    None indicated          MEDICATIONS GIVEN IN ER  Medications   sodium chloride 0.9 % bolus 1,000 mL (1,000 mL Intravenous New Bag 2/7/23 1445)                   MEDICAL DECISION MAKING, PROGRESS, and CONSULTS    All labs have been independently reviewed by me.  All radiology studies have been reviewed by me and I have also reviewed the radiology report.   EKG's independently viewed and interpreted by me.  Discussion below represents my analysis of pertinent findings related to patient's condition, differential diagnosis, treatment plan and final disposition.      Additional sources:  - Discussed/ obtained information from independent historians: None    - External (non-ED) record review: None    - Chronic or social conditions impacting care: None    - Shared decision  making: With patient      Orders placed during this visit:  Orders Placed This Encounter   Procedures   • XR Chest 2 View   • CT Head Without Contrast   • Comprehensive Metabolic Panel   • Troponin   • Magnesium   • CBC Auto Differential   • High Sensitivity Troponin T 2Hr   • ECG 12 Lead Syncope   • CBC & Differential       Differential diagnosis:    Differential diagnosis includes intracranial hemorrhage, skull fracture, concussion, postconcussion syndrome      Independent interpretation of labs, radiology studies, and discussions with consultants:             Appropriate PPE was worn by both the provider and the patient during HPI and exam    DIAGNOSIS  Final diagnoses:   Postconcussion syndrome         DISPOSITION  Patient is remained stable during his stay and is able to ambulate without difficulty.  CT head was negative.  I discussed with patient my concerns for a likely postconcussion syndrome.  We discussed the symptoms of this as well as timeframe for recovery.  I informed the patient I did want him to have close follow-up with his primary care provider.  I will give him a note for couple days off work to rest.  Patient verbalized understanding and agreement with the plan.            Latest Documented Vital Signs:  As of 16:25 EST  BP- 141/98 HR- 71 Temp- 97.8 °F (36.6 °C) O2 sat- 99%              --    Please note that portions of this were completed with a voice recognition program.       Note Disclaimer: At [facility], we believe that sharing information builds trust and better relationships. You are receiving this note because you are receiving care at [facility] or recently visited. It is possible you will see health information before a provider has talked with you about it. This kind of information can be easy to misunderstand. To help you fully understand what it means for your health, we urge you to discuss this note with your provider.           Jose Ybarra PA-C  02/07/23 4074

## 2023-04-26 DIAGNOSIS — K21.9 GASTROESOPHAGEAL REFLUX DISEASE WITHOUT ESOPHAGITIS: ICD-10-CM

## 2023-05-05 ENCOUNTER — TELEPHONE (OUTPATIENT)
Dept: GASTROENTEROLOGY | Facility: CLINIC | Age: 48
End: 2023-05-05
Payer: COMMERCIAL

## 2023-05-05 DIAGNOSIS — K21.9 GASTROESOPHAGEAL REFLUX DISEASE WITHOUT ESOPHAGITIS: ICD-10-CM

## 2023-09-14 ENCOUNTER — OFFICE VISIT (OUTPATIENT)
Dept: GASTROENTEROLOGY | Facility: CLINIC | Age: 48
End: 2023-09-14
Payer: COMMERCIAL

## 2023-09-14 VITALS
TEMPERATURE: 97.8 F | HEART RATE: 78 BPM | BODY MASS INDEX: 26.88 KG/M2 | HEIGHT: 70 IN | SYSTOLIC BLOOD PRESSURE: 134 MMHG | WEIGHT: 187.8 LBS | OXYGEN SATURATION: 98 % | DIASTOLIC BLOOD PRESSURE: 95 MMHG

## 2023-09-14 DIAGNOSIS — Z80.0 FH: COLON CANCER: ICD-10-CM

## 2023-09-14 DIAGNOSIS — R19.7 DIARRHEA, UNSPECIFIED TYPE: ICD-10-CM

## 2023-09-14 DIAGNOSIS — R07.9 LEFT-SIDED CHEST PAIN: ICD-10-CM

## 2023-09-14 DIAGNOSIS — K21.9 GASTROESOPHAGEAL REFLUX DISEASE WITHOUT ESOPHAGITIS: ICD-10-CM

## 2023-09-14 DIAGNOSIS — R10.12 LUQ ABDOMINAL PAIN: Primary | ICD-10-CM

## 2023-09-14 DIAGNOSIS — Z86.010 HISTORY OF COLON POLYPS: ICD-10-CM

## 2023-09-14 PROCEDURE — 99214 OFFICE O/P EST MOD 30 MIN: CPT | Performed by: INTERNAL MEDICINE

## 2023-09-14 RX ORDER — VARENICLINE TARTRATE 1 MG/1
1 TABLET, FILM COATED ORAL
COMMUNITY

## 2023-09-14 RX ORDER — VARENICLINE TARTRATE 0.5 MG/1
0.5 TABLET, FILM COATED ORAL
COMMUNITY
Start: 2023-04-26

## 2023-09-14 NOTE — PROGRESS NOTES
Chief Complaint   Patient presents with    Ulcerative Colitis    Diarrhea       History of Present Illness:   48 y.o. male  that I last saw in 1/22 and who is here for a refill of his Nexium. In 1/22 I had said:  Assessment:  1.  Diarrhea with colonoscopy showing some inflammation. His brother has UC. Could the patient have microscopic colitis, UC or crohn's colitis? This may be microscopic colitis at this time because he is responding to colestipol and didn't respond to steroids.   2. H/o colon polyps  3. FH (mom at 42 yrs of age) colon cancer.     Recommendations:  1. Continue Colestipol  2. F/u 6mos.   3. Labs.        He last had an EGD and colonoscopy in 1 of 2020.       He is decreasing smoking. Occas ETOH.        He has heartburn.        He has a LUQ abdominal burning and left lat chest pain. Worse with deep breath. No SOA. No change with exercise. He sees Dr. Kerr (Cardiologist). No  nausea or vomtting. No fevers, chills. No dysphagia.        Occas diarrhea. No consitpation. He averages 1-3  BM/day. Worse if smoking or drinking. No rectal bleeding or melena. Colestipol 2/AM helps his diarrhea. Denies NSAIDs use. Weight has increased.     History reviewed. No pertinent past medical history.    Past Surgical History:   Procedure Laterality Date    COLONOSCOPY  02/08/2016    IH, no specimens collected    COLONOSCOPY  08/28/2014    IH, tubular adenoma    COLONOSCOPY N/A 01/10/2020    Procedure: COLONOSCOPY INTO CECUM AND TI WITH COLD BX POLYPECTOMIES, BIOPSIES;  Surgeon: Adrian Key MD;  Location: [facility] ENDOSCOPY;  Service: Gastroenterology    CYST REMOVAL      ENDOSCOPY N/A 01/10/2020    Procedure: ESOPHAGOGASTRODUODENOSCOPY WITH BIOPSIES;  Surgeon: Adrian Key MD;  Location: [facility] ENDOSCOPY;  Service: Gastroenterology         Current Outpatient Medications:     atorvastatin (LIPITOR) 40 MG tablet, Take 1 tablet by mouth Daily., Disp: , Rfl:     colestipol (COLESTID) 1 g tablet, Take 2 pills  by mouth twice a day, Disp: 360 tablet, Rfl: 3    esomeprazole (nexIUM) 20 MG capsule, Take 1 capsule by mouth Every Morning Before Breakfast., Disp: 90 capsule, Rfl: 3    tadalafil (CIALIS) 5 MG tablet, , Disp: , Rfl:     varenicline (CHANTIX) 0.5 MG tablet, Take 1 tablet by mouth. (Patient not taking: Reported on 9/14/2023), Disp: , Rfl:     varenicline (CHANTIX) 1 MG tablet, Take 1 tablet by mouth. (Patient not taking: Reported on 9/14/2023), Disp: , Rfl:     No Known Allergies    Family History   Problem Relation Age of Onset    Colon cancer Mother     Depression Mother     Heart disease Father     Diabetes Father     Depression Father     Glaucoma Father     Ulcerative colitis Brother     Colon cancer Maternal Grandfather        Social History     Socioeconomic History    Marital status:    Tobacco Use    Smoking status: Every Day     Packs/day: 0.50     Types: Cigarettes    Smokeless tobacco: Never    Tobacco comments:     Since age 19   Substance and Sexual Activity    Alcohol use: Yes     Comment: social    Drug use: No    Sexual activity: Yes     Partners: Female       Review of Systems   Gastrointestinal:  Positive for abdominal pain.   All other systems reviewed and are negative.  Pertinent positives and negatives documented in the HPI and all other systems reviewed and were found to be negative.  Vitals:    09/14/23 0806   BP: 134/95   Pulse: 78   Temp: 97.8 °F (36.6 °C)   SpO2: 98%       Physical Exam  Vitals reviewed.   Constitutional:       General: He is not in acute distress.     Appearance: Normal appearance. He is well-developed. He is not diaphoretic.   HENT:      Head: Normocephalic and atraumatic. Hair is normal.      Right Ear: Hearing, tympanic membrane, ear canal and external ear normal.      Left Ear: Hearing, tympanic membrane, ear canal and external ear normal.      Nose: Nose normal. No nasal deformity.      Mouth/Throat:      Mouth: Mucous membranes are moist. No oral lesions.       Pharynx: Uvula midline. No uvula swelling.   Eyes:      General: Lids are normal. No scleral icterus.        Right eye: No discharge.         Left eye: No discharge.      Extraocular Movements: Extraocular movements intact.      Right eye: Normal extraocular motion and no nystagmus.      Left eye: Normal extraocular motion and no nystagmus.      Conjunctiva/sclera: Conjunctivae normal.      Pupils: Pupils are equal, round, and reactive to light.   Neck:      Thyroid: No thyromegaly.      Vascular: No JVD.   Cardiovascular:      Rate and Rhythm: Normal rate and regular rhythm.      Pulses: Normal pulses.      Heart sounds: Normal heart sounds. No murmur heard.    No gallop.   Pulmonary:      Effort: Pulmonary effort is normal. No respiratory distress.      Breath sounds: Normal breath sounds. No wheezing or rales.   Chest:      Chest wall: No tenderness.   Abdominal:      General: Bowel sounds are normal. There is no distension.      Palpations: Abdomen is soft. There is no mass.      Tenderness: There is no abdominal tenderness. There is no guarding.      Hernia: No hernia is present.   Genitourinary:     Rectum: Normal. Guaiac result negative.   Musculoskeletal:         General: No tenderness or deformity. Normal range of motion.      Cervical back: Normal range of motion and neck supple.   Lymphadenopathy:      Cervical: No cervical adenopathy.   Skin:     General: Skin is warm and dry.      Findings: No rash.   Neurological:      Mental Status: He is alert and oriented to person, place, and time.      Cranial Nerves: No cranial nerve deficit.      Motor: No abnormal muscle tone.      Coordination: Coordination normal.      Deep Tendon Reflexes: Reflexes are normal and symmetric. Reflexes normal.   Psychiatric:         Mood and Affect: Mood normal.         Behavior: Behavior normal.         Thought Content: Thought content normal.         Judgment: Judgment normal.       Diagnoses and all orders for this  visit:    1. LUQ abdominal pain (Primary)  -     CT Chest With Contrast; Future  -     CT Abdomen Pelvis With Contrast; Future    2. Gastroesophageal reflux disease without esophagitis  -     esomeprazole (nexIUM) 20 MG capsule; Take 1 capsule by mouth Every Morning Before Breakfast.  Dispense: 90 capsule; Refill: 3  -     CT Chest With Contrast; Future  -     CT Abdomen Pelvis With Contrast; Future    3. Left-sided chest pain  -     CT Chest With Contrast; Future  -     CT Abdomen Pelvis With Contrast; Future    4. FH: colon cancer    5. History of colon polyps    6. Diarrhea, unspecified type      Assessment:  GERD  History of colon polyps.  His last colonoscopy was in January 2020.  Family history (mother at 42 years of age) of colon cancer.  Left chest pain.  LUQ abd pain and left chest pain. .      Recommendations:  Stop smoking  Go see Dr. Kerr about the left sided chest pain.  CT chest, abd and pelvis.  F/u 3 mos.     No follow-ups on file.    Adrian Key MD  9/14/2023

## 2023-09-29 ENCOUNTER — HOSPITAL ENCOUNTER (OUTPATIENT)
Facility: HOSPITAL | Age: 48
Discharge: HOME | End: 2023-09-29
Admitting: INTERNAL MEDICINE
Payer: COMMERCIAL

## 2023-09-29 DIAGNOSIS — K21.9 GASTROESOPHAGEAL REFLUX DISEASE WITHOUT ESOPHAGITIS: ICD-10-CM

## 2023-09-29 DIAGNOSIS — R10.12 LUQ ABDOMINAL PAIN: ICD-10-CM

## 2023-09-29 DIAGNOSIS — R07.9 LEFT-SIDED CHEST PAIN: ICD-10-CM

## 2023-09-29 PROCEDURE — 25510000001 IOPAMIDOL 61 % SOLUTION: Performed by: INTERNAL MEDICINE

## 2023-09-29 PROCEDURE — 71260 CT THORAX DX C+: CPT

## 2023-09-29 PROCEDURE — 74177 CT ABD & PELVIS W/CONTRAST: CPT

## 2023-09-29 PROCEDURE — 0 DIATRIZOATE MEGLUMINE & SODIUM PER 1 ML: Performed by: INTERNAL MEDICINE

## 2023-09-29 RX ADMIN — DIATRIZOATE MEGLUMINE AND DIATRIZOATE SODIUM 30 ML: 600; 100 SOLUTION ORAL; RECTAL at 13:11

## 2023-09-29 RX ADMIN — IOPAMIDOL 100 ML: 612 INJECTION, SOLUTION INTRAVENOUS at 13:11

## 2023-09-30 NOTE — PROGRESS NOTES
09/30/23       Tell him that his CT of the chest abdomen and pelvis looked good.  Please send a copy of this report to his PCP.  Luis bustillos

## 2023-10-03 ENCOUNTER — TELEPHONE (OUTPATIENT)
Dept: GASTROENTEROLOGY | Facility: CLINIC | Age: 48
End: 2023-10-03
Payer: COMMERCIAL

## 2023-10-03 NOTE — TELEPHONE ENCOUNTER
----- Message from Adrian Key MD sent at 9/30/2023  4:40 PM EDT -----  09/30/23       Tell him that his CT of the chest abdomen and pelvis looked good.  Please send a copy of this report to his PCP.  Luis bustillos

## 2023-10-03 NOTE — TELEPHONE ENCOUNTER
Called pt and advised of Dr Key's note.  Pt verbalized understanding.  Copy of report sent to PCP via [employer].

## 2023-11-24 NOTE — TELEPHONE ENCOUNTER
Caller: Otto Pantoja    Relationship: Self    Best call back number:[phone]    Requested Prescriptions:   esomeprazole (nexIUM) 20 MG capsule         Pharmacy where request should be sent[facility] - [address] [facility] [address] AT PORTAL TO REG[facility] - [phone]  - [phone] FX       Does the patient have less than a 3 day supply:  [x] Yes  [] No  THE PATIENT HAS NO MEDICATION LEFT. IF THERE IS A PROBLEM GETTING THIS MEDICATION REFILL PLEASE CONTACT THE PATIENT.     Would you like a call back once the refill request has been completed: [x] Yes [] No    If the office needs to give you a call back, can they leave a voicemail: [x] Yes [] No    Laura Bailey Rep   05/05/23 13:47 EDT           
Called pt and scheduled follow up    He would like for his refill to go to [facility], since he is out of medication.  can you send enough pantoprazole to get him through until his apt?  
Last seen January 2022.  Next available follow-up with Dr. Key in September.  He must keep his follow-up to get any more refills.  Refilled until then.  
No

## (undated) DEVICE — BITEBLOCK OMNI BLOC

## (undated) DEVICE — TUBING, SUCTION, 1/4" X 10', STRAIGHT: Brand: MEDLINE

## (undated) DEVICE — KT ORCA ORCAPOD DISP STRL

## (undated) DEVICE — ADAPT CLN BIOGUARD AIR/H2O DISP

## (undated) DEVICE — SINGLE-USE BIOPSY FORCEPS: Brand: RADIAL JAW 4

## (undated) DEVICE — MSK PROC CURAPLEX O2 2/ADAPT 7FT

## (undated) DEVICE — SENSR O2 OXIMAX FNGR A/ 18IN NONSTR

## (undated) DEVICE — THE TORRENT IRRIGATION SCOPE CONNECTOR IS USED WITH THE TORRENT IRRIGATION TUBING TO PROVIDE IRRIGATION FLUIDS SUCH AS STERILE WATER DURING GASTROINTESTINAL ENDOSCOPIC PROCEDURES WHEN USED IN CONJUNCTION WITH AN IRRIGATION PUMP (OR ELECTROSURGICAL UNIT).: Brand: TORRENT